# Patient Record
Sex: MALE | Race: WHITE | NOT HISPANIC OR LATINO | URBAN - METROPOLITAN AREA
[De-identification: names, ages, dates, MRNs, and addresses within clinical notes are randomized per-mention and may not be internally consistent; named-entity substitution may affect disease eponyms.]

---

## 2020-12-26 ENCOUNTER — OUTPATIENT (OUTPATIENT)
Dept: OUTPATIENT SERVICES | Facility: HOSPITAL | Age: 40
LOS: 1 days | End: 2020-12-26
Payer: COMMERCIAL

## 2020-12-26 DIAGNOSIS — Z20.828 CONTACT WITH AND (SUSPECTED) EXPOSURE TO OTHER VIRAL COMMUNICABLE DISEASES: ICD-10-CM

## 2020-12-26 PROCEDURE — C9803: CPT

## 2020-12-26 PROCEDURE — U0003: CPT

## 2020-12-27 DIAGNOSIS — Z20.828 CONTACT WITH AND (SUSPECTED) EXPOSURE TO OTHER VIRAL COMMUNICABLE DISEASES: ICD-10-CM

## 2020-12-27 LAB — SARS-COV-2 RNA SPEC QL NAA+PROBE: SIGNIFICANT CHANGE UP

## 2021-12-03 ENCOUNTER — OUTPATIENT (OUTPATIENT)
Dept: OUTPATIENT SERVICES | Facility: HOSPITAL | Age: 41
LOS: 1 days | End: 2021-12-03
Payer: COMMERCIAL

## 2021-12-03 DIAGNOSIS — Z20.828 CONTACT WITH AND (SUSPECTED) EXPOSURE TO OTHER VIRAL COMMUNICABLE DISEASES: ICD-10-CM

## 2021-12-03 LAB — SARS-COV-2 RNA SPEC QL NAA+PROBE: DETECTED

## 2021-12-03 PROCEDURE — U0003: CPT

## 2021-12-03 PROCEDURE — C9803: CPT

## 2021-12-03 PROCEDURE — U0005: CPT

## 2021-12-04 DIAGNOSIS — Z20.828 CONTACT WITH AND (SUSPECTED) EXPOSURE TO OTHER VIRAL COMMUNICABLE DISEASES: ICD-10-CM

## 2024-05-31 ENCOUNTER — INPATIENT (INPATIENT)
Facility: HOSPITAL | Age: 44
LOS: 3 days | Discharge: HOME | DRG: 558 | End: 2024-06-04
Attending: STUDENT IN AN ORGANIZED HEALTH CARE EDUCATION/TRAINING PROGRAM | Admitting: STUDENT IN AN ORGANIZED HEALTH CARE EDUCATION/TRAINING PROGRAM
Payer: COMMERCIAL

## 2024-05-31 VITALS — HEIGHT: 67 IN

## 2024-05-31 DIAGNOSIS — M79.A9 NONTRAUMATIC COMPARTMENT SYNDROME OF OTHER SITES: ICD-10-CM

## 2024-05-31 LAB
ABO RH CONFIRMATION: SIGNIFICANT CHANGE UP
ADD ON TEST-SPECIMEN IN LAB: SIGNIFICANT CHANGE UP
ALBUMIN SERPL ELPH-MCNC: 4.1 G/DL — SIGNIFICANT CHANGE UP (ref 3.3–5)
ALP SERPL-CCNC: 59 U/L — SIGNIFICANT CHANGE UP (ref 40–120)
ALT FLD-CCNC: 28 U/L — SIGNIFICANT CHANGE UP (ref 12–78)
ANION GAP SERPL CALC-SCNC: 3 MMOL/L — LOW (ref 5–17)
APPEARANCE UR: CLEAR — SIGNIFICANT CHANGE UP
APTT BLD: 30.5 SEC — SIGNIFICANT CHANGE UP (ref 24.5–35.6)
AST SERPL-CCNC: 14 U/L — LOW (ref 15–37)
BASOPHILS # BLD AUTO: 0.09 K/UL — SIGNIFICANT CHANGE UP (ref 0–0.2)
BASOPHILS NFR BLD AUTO: 1 % — SIGNIFICANT CHANGE UP (ref 0–2)
BILIRUB SERPL-MCNC: 0.5 MG/DL — SIGNIFICANT CHANGE UP (ref 0.2–1.2)
BILIRUB UR-MCNC: NEGATIVE — SIGNIFICANT CHANGE UP
BLD GP AB SCN SERPL QL: SIGNIFICANT CHANGE UP
BUN SERPL-MCNC: 16 MG/DL — SIGNIFICANT CHANGE UP (ref 7–23)
CALCIUM SERPL-MCNC: 9.4 MG/DL — SIGNIFICANT CHANGE UP (ref 8.5–10.1)
CHLORIDE SERPL-SCNC: 105 MMOL/L — SIGNIFICANT CHANGE UP (ref 96–108)
CK SERPL-CCNC: 141 U/L — SIGNIFICANT CHANGE UP (ref 26–308)
CO2 SERPL-SCNC: 30 MMOL/L — SIGNIFICANT CHANGE UP (ref 22–31)
COLOR SPEC: YELLOW — SIGNIFICANT CHANGE UP
CREAT SERPL-MCNC: 1.33 MG/DL — HIGH (ref 0.5–1.3)
D DIMER BLD IA.RAPID-MCNC: 292 NG/ML DDU — HIGH
DIFF PNL FLD: NEGATIVE — SIGNIFICANT CHANGE UP
EGFR: 67 ML/MIN/1.73M2 — SIGNIFICANT CHANGE UP
EOSINOPHIL # BLD AUTO: 0.19 K/UL — SIGNIFICANT CHANGE UP (ref 0–0.5)
EOSINOPHIL NFR BLD AUTO: 2.1 % — SIGNIFICANT CHANGE UP (ref 0–6)
ERYTHROCYTE [SEDIMENTATION RATE] IN BLOOD: 8 MM/HR — SIGNIFICANT CHANGE UP (ref 0–15)
GLUCOSE SERPL-MCNC: 90 MG/DL — SIGNIFICANT CHANGE UP (ref 70–99)
GLUCOSE UR QL: NEGATIVE MG/DL — SIGNIFICANT CHANGE UP
HCT VFR BLD CALC: 41.7 % — SIGNIFICANT CHANGE UP (ref 39–50)
HGB BLD-MCNC: 14.2 G/DL — SIGNIFICANT CHANGE UP (ref 13–17)
IMM GRANULOCYTES NFR BLD AUTO: 0.2 % — SIGNIFICANT CHANGE UP (ref 0–0.9)
INR BLD: 0.95 RATIO — SIGNIFICANT CHANGE UP (ref 0.85–1.18)
KETONES UR-MCNC: ABNORMAL MG/DL
LEUKOCYTE ESTERASE UR-ACNC: NEGATIVE — SIGNIFICANT CHANGE UP
LYMPHOCYTES # BLD AUTO: 2.71 K/UL — SIGNIFICANT CHANGE UP (ref 1–3.3)
LYMPHOCYTES # BLD AUTO: 29.5 % — SIGNIFICANT CHANGE UP (ref 13–44)
MCHC RBC-ENTMCNC: 29.7 PG — SIGNIFICANT CHANGE UP (ref 27–34)
MCHC RBC-ENTMCNC: 34.1 GM/DL — SIGNIFICANT CHANGE UP (ref 32–36)
MCV RBC AUTO: 87.2 FL — SIGNIFICANT CHANGE UP (ref 80–100)
MONOCYTES # BLD AUTO: 0.79 K/UL — SIGNIFICANT CHANGE UP (ref 0–0.9)
MONOCYTES NFR BLD AUTO: 8.6 % — SIGNIFICANT CHANGE UP (ref 2–14)
NEUTROPHILS # BLD AUTO: 5.39 K/UL — SIGNIFICANT CHANGE UP (ref 1.8–7.4)
NEUTROPHILS NFR BLD AUTO: 58.6 % — SIGNIFICANT CHANGE UP (ref 43–77)
NITRITE UR-MCNC: NEGATIVE — SIGNIFICANT CHANGE UP
PH UR: 5.5 — SIGNIFICANT CHANGE UP (ref 5–8)
PLATELET # BLD AUTO: 338 K/UL — SIGNIFICANT CHANGE UP (ref 150–400)
POTASSIUM SERPL-MCNC: 4.4 MMOL/L — SIGNIFICANT CHANGE UP (ref 3.5–5.3)
POTASSIUM SERPL-SCNC: 4.4 MMOL/L — SIGNIFICANT CHANGE UP (ref 3.5–5.3)
PROT SERPL-MCNC: 7.5 GM/DL — SIGNIFICANT CHANGE UP (ref 6–8.3)
PROT UR-MCNC: NEGATIVE MG/DL — SIGNIFICANT CHANGE UP
PROTHROM AB SERPL-ACNC: 10.7 SEC — SIGNIFICANT CHANGE UP (ref 9.5–13)
RBC # BLD: 4.78 M/UL — SIGNIFICANT CHANGE UP (ref 4.2–5.8)
RBC # FLD: 12.8 % — SIGNIFICANT CHANGE UP (ref 10.3–14.5)
SODIUM SERPL-SCNC: 138 MMOL/L — SIGNIFICANT CHANGE UP (ref 135–145)
SP GR SPEC: 1.03 — SIGNIFICANT CHANGE UP (ref 1–1.03)
UROBILINOGEN FLD QL: 1 MG/DL — SIGNIFICANT CHANGE UP (ref 0.2–1)
WBC # BLD: 9.19 K/UL — SIGNIFICANT CHANGE UP (ref 3.8–10.5)
WBC # FLD AUTO: 9.19 K/UL — SIGNIFICANT CHANGE UP (ref 3.8–10.5)

## 2024-05-31 PROCEDURE — 27602 DECOMPRESSION OF LOWER LEG: CPT

## 2024-05-31 PROCEDURE — 85610 PROTHROMBIN TIME: CPT

## 2024-05-31 PROCEDURE — 84100 ASSAY OF PHOSPHORUS: CPT

## 2024-05-31 PROCEDURE — 85025 COMPLETE CBC W/AUTO DIFF WBC: CPT

## 2024-05-31 PROCEDURE — 85730 THROMBOPLASTIN TIME PARTIAL: CPT

## 2024-05-31 PROCEDURE — 85027 COMPLETE CBC AUTOMATED: CPT

## 2024-05-31 PROCEDURE — 93971 EXTREMITY STUDY: CPT | Mod: 26,LT

## 2024-05-31 PROCEDURE — 73590 X-RAY EXAM OF LOWER LEG: CPT | Mod: 26,LT

## 2024-05-31 PROCEDURE — 27602 DECOMPRESSION OF LOWER LEG: CPT | Mod: AS

## 2024-05-31 PROCEDURE — 36415 COLL VENOUS BLD VENIPUNCTURE: CPT

## 2024-05-31 PROCEDURE — 80053 COMPREHEN METABOLIC PANEL: CPT

## 2024-05-31 PROCEDURE — 99285 EMERGENCY DEPT VISIT HI MDM: CPT

## 2024-05-31 PROCEDURE — 93010 ELECTROCARDIOGRAM REPORT: CPT

## 2024-05-31 PROCEDURE — 85379 FIBRIN DEGRADATION QUANT: CPT

## 2024-05-31 PROCEDURE — 80048 BASIC METABOLIC PNL TOTAL CA: CPT

## 2024-05-31 PROCEDURE — C1889: CPT

## 2024-05-31 PROCEDURE — 73701 CT LOWER EXTREMITY W/DYE: CPT | Mod: 26,LT,MC

## 2024-05-31 PROCEDURE — 73562 X-RAY EXAM OF KNEE 3: CPT | Mod: 26,LT

## 2024-05-31 PROCEDURE — 97162 PT EVAL MOD COMPLEX 30 MIN: CPT | Mod: GP

## 2024-05-31 PROCEDURE — 83735 ASSAY OF MAGNESIUM: CPT

## 2024-05-31 RX ORDER — ONDANSETRON 8 MG/1
4 TABLET, FILM COATED ORAL ONCE
Refills: 0 | Status: DISCONTINUED | OUTPATIENT
Start: 2024-05-31 | End: 2024-05-31

## 2024-05-31 RX ORDER — CEFAZOLIN SODIUM 1 G
2000 VIAL (EA) INJECTION EVERY 8 HOURS
Refills: 0 | Status: DISCONTINUED | OUTPATIENT
Start: 2024-05-31 | End: 2024-05-31

## 2024-05-31 RX ORDER — ACETAMINOPHEN 500 MG
1000 TABLET ORAL ONCE
Refills: 0 | Status: DISCONTINUED | OUTPATIENT
Start: 2024-05-31 | End: 2024-06-04

## 2024-05-31 RX ORDER — MORPHINE SULFATE 50 MG/1
2 CAPSULE, EXTENDED RELEASE ORAL EVERY 4 HOURS
Refills: 0 | Status: DISCONTINUED | OUTPATIENT
Start: 2024-05-31 | End: 2024-05-31

## 2024-05-31 RX ORDER — OXYCODONE HYDROCHLORIDE 5 MG/1
5 TABLET ORAL EVERY 6 HOURS
Refills: 0 | Status: DISCONTINUED | OUTPATIENT
Start: 2024-05-31 | End: 2024-05-31

## 2024-05-31 RX ORDER — HYDROMORPHONE HYDROCHLORIDE 2 MG/ML
0.5 INJECTION INTRAMUSCULAR; INTRAVENOUS; SUBCUTANEOUS
Refills: 0 | Status: DISCONTINUED | OUTPATIENT
Start: 2024-05-31 | End: 2024-05-31

## 2024-05-31 RX ORDER — ONDANSETRON 8 MG/1
4 TABLET, FILM COATED ORAL EVERY 6 HOURS
Refills: 0 | Status: DISCONTINUED | OUTPATIENT
Start: 2024-05-31 | End: 2024-06-04

## 2024-05-31 RX ORDER — OXYCODONE AND ACETAMINOPHEN 5; 325 MG/1; MG/1
1 TABLET ORAL EVERY 4 HOURS
Refills: 0 | Status: DISCONTINUED | OUTPATIENT
Start: 2024-05-31 | End: 2024-06-04

## 2024-05-31 RX ORDER — MORPHINE SULFATE 50 MG/1
2 CAPSULE, EXTENDED RELEASE ORAL EVERY 4 HOURS
Refills: 0 | Status: DISCONTINUED | OUTPATIENT
Start: 2024-05-31 | End: 2024-06-04

## 2024-05-31 RX ORDER — SODIUM CHLORIDE 9 MG/ML
3 INJECTION INTRAMUSCULAR; INTRAVENOUS; SUBCUTANEOUS ONCE
Refills: 0 | Status: DISCONTINUED | OUTPATIENT
Start: 2024-05-31 | End: 2024-06-04

## 2024-05-31 RX ORDER — HEPARIN SODIUM 5000 [USP'U]/ML
5000 INJECTION INTRAVENOUS; SUBCUTANEOUS EVERY 8 HOURS
Refills: 0 | Status: DISCONTINUED | OUTPATIENT
Start: 2024-06-01 | End: 2024-06-04

## 2024-05-31 RX ORDER — CEFAZOLIN SODIUM 1 G
2000 VIAL (EA) INJECTION ONCE
Refills: 0 | Status: COMPLETED | OUTPATIENT
Start: 2024-05-31 | End: 2024-06-01

## 2024-05-31 RX ORDER — SODIUM CHLORIDE 9 MG/ML
1000 INJECTION, SOLUTION INTRAVENOUS
Refills: 0 | Status: DISCONTINUED | OUTPATIENT
Start: 2024-05-31 | End: 2024-05-31

## 2024-05-31 RX ORDER — OXYCODONE HYDROCHLORIDE 5 MG/1
10 TABLET ORAL ONCE
Refills: 0 | Status: DISCONTINUED | OUTPATIENT
Start: 2024-05-31 | End: 2024-05-31

## 2024-05-31 RX ADMIN — HYDROMORPHONE HYDROCHLORIDE 0.5 MILLIGRAM(S): 2 INJECTION INTRAMUSCULAR; INTRAVENOUS; SUBCUTANEOUS at 20:21

## 2024-05-31 RX ADMIN — HYDROMORPHONE HYDROCHLORIDE 0.5 MILLIGRAM(S): 2 INJECTION INTRAMUSCULAR; INTRAVENOUS; SUBCUTANEOUS at 20:06

## 2024-05-31 RX ADMIN — HYDROMORPHONE HYDROCHLORIDE 0.5 MILLIGRAM(S): 2 INJECTION INTRAMUSCULAR; INTRAVENOUS; SUBCUTANEOUS at 19:51

## 2024-05-31 RX ADMIN — HYDROMORPHONE HYDROCHLORIDE 0.5 MILLIGRAM(S): 2 INJECTION INTRAMUSCULAR; INTRAVENOUS; SUBCUTANEOUS at 19:36

## 2024-05-31 RX ADMIN — SODIUM CHLORIDE 75 MILLILITER(S): 9 INJECTION, SOLUTION INTRAVENOUS at 19:42

## 2024-05-31 NOTE — ED STATDOCS - PHYSICAL EXAMINATION
Gen:  Well appearing in NAD  Head:  NC/AT  Resp: No distress   Ext: no deformities. LLE swelling behind knee to calf down to ankle and foot.   Skin: warm and dry as visualized

## 2024-05-31 NOTE — H&P ADULT - ATTENDING COMMENTS
44M with two days of calf pain after strenuous activity  Imaging shows intramusular hematoma  Physical exam with some numbness/tingling and concern for diminished ablility to dorsiflex foot  Taken emergently to OR for four compartment fasciotomy and evacuation of hematoma as high concern for compartment syndrome  Risks and benefits, including possible nerve damange from hematoma and future foot drop discussed with patient, who agreed to continue

## 2024-05-31 NOTE — H&P ADULT - HISTORY OF PRESENT ILLNESS
45 yo M with no significant PMHx p/w L calf pain. He report of intermittent pain for the past 2 weeks. This was associated with swelling which usually resolved spontaneously. He however experienced severe calf pain with increased swelling compare to prior episode. He denies trauma, not on any AC, no prior hx of this symptoms

## 2024-05-31 NOTE — ED STATDOCS - OBJECTIVE STATEMENT
45 y/o male with a PMHx of meniscus surgery 15 years ago presents to the ED c/o intermittent left calf swelling over the last few days. Pt reports left calf swelling has been constant since 2pm yesterday. Pt was seen in ortho office yesterday and was sent for LLE US. Pt had US which was negative for DVT. Pt reports 8/10 pain yesterday. No falls or trauma. No other complaints at this time.

## 2024-05-31 NOTE — H&P ADULT - NSHPPHYSICALEXAM_GEN_ALL_CORE
General: NAD, resting comfortably  HEENT: NC/AT, EOMI, normal hearing, no oral lesions, no LAD, neck supple  Pulmonary: normal resp effort, CTA-B  Cardiovascular: NSR, no murmurs  Abdominal: soft, ND/NT, no organomegaly  Extremities: Swollen L calf and ankle with tenderness on dorsiflexion of the foot limiting FROM, L calf measures 44cm circumference at a reference point 10cm from the tuberosity. Sensation is slight decrease compared to the RLE, no skin changes    Neuro: A/O x 3, CNs II-XII grossly intact, normal sensation, no focal deficits  Pulses: palpable distal pulses

## 2024-05-31 NOTE — ED STATDOCS - PRINCIPAL DIAGNOSIS
Brief Postoperative Note      Patient: Marty Pat  YOB: 1975  MRN: [id number]    Date of Procedure: 1/31/2024    Pre-Op Diagnosis Codes:     * Screen for colon cancer [Z12.11]    Post-Op Diagnosis: sigmoid diverticulosis       Procedure(s):  COLORECTAL CANCER SCREENING, NOT HIGH RISK    Surgeon(s):  Yony Perez MD    Assistant:  * No surgical staff found *    Anesthesia: Monitor Anesthesia Care    Estimated Blood Loss (mL): Minimal    Complications: None    Specimens:   * No specimens in log *    Implants:  * No implants in log *      Drains: * No LDAs found *    Findings: as above      Electronically signed by Yony Perez MD on 1/31/2024 at 1:51 PM   Compartment syndrome, nontraumatic

## 2024-05-31 NOTE — CONSULT NOTE ADULT - ASSESSMENT
45 yo M p/w L calf swelling.   US report 9.5 x 6.8 x 3.7 cm heterogeneous in echotexture collection within the left calf musculature, possible hematoma    Clinical assessment concern for early onset compartment syndrome     Recommendation   CT IV of LLE   Full labs including CPK  Vascular surgery consult for evaluation

## 2024-05-31 NOTE — ED ADULT TRIAGE NOTE - CHIEF COMPLAINT QUOTE
Pt presents to the ED c/o intermittent left calf swelling x2 weeks, worsening yesterday at 2pm. Pt reports pain 8-10/10. No redness noted. Denies SOB, blood thinner use, or fevers. Pt saw orthopedist and had an ultrasound done at [facility] yesterday which was negative for a DVT. Pt has been taking Motrin with minimal relief. PMH of left meniscus surgery 15 years ago.

## 2024-05-31 NOTE — ED STATDOCS - ATTENDING APP SHARED VISIT CONTRIBUTION OF CARE
Dr. Suarez: I performed a face to face bedside interview with patient regarding history of present illness, review of symptoms and past medical history. I completed an independent physical exam.  I have discussed patient's plan of care with PA.   I agree with note as stated above, having amended the EMR as needed to reflect my findings.   This includes HISTORY OF PRESENT ILLNESS, HIV, PAST MEDICAL/SURGICAL/FAMILY/SOCIAL HISTORY, ALLERGIES AND HOME MEDICATIONS, REVIEW OF SYSTEMS, PHYSICAL EXAM, and any PROGRESS NOTES during the time I functioned as the attending physician for this patient.  RAVEN Suarez DO

## 2024-05-31 NOTE — H&P ADULT - ASSESSMENT
45 yo M p/w L calf swelling.   US report 9.5 x 6.8 x 3.7 cm heterogeneous in echotexture collection within the left calf musculature, possible hematoma    Clinical assessment concern for early onset compartment syndrome       Plan   Admit for Vascular surgery   Keep NPO for possible OR  Keep LLE elevated   Pain control   SCD for DVT ppx   F/u CT scan of  LLE   Preop labs with      Discussed with Attending

## 2024-05-31 NOTE — ED ADULT NURSE NOTE - OBJECTIVE STATEMENT
Pt presents to the ED c/o intermittent left calf swelling x2 weeks, worsening yesterday at 2pm. Pt reports pain 8-10/10. No redness noted. Denies SOB, blood thinner use, or fevers. Pt saw orthopedist and had an ultrasound done at [facility] yesterday which was negative for a DVT. Pt has been taking Motrin with minimal relief. PMH of left meniscus surgery 15 years ago.

## 2024-05-31 NOTE — ED ADULT NURSE NOTE - CHIEF COMPLAINT QUOTE
Pt presents to the ED c/o intermittent left calf swelling x2 weeks, worsening yesterday at 2pm. Pt reports pain 8-10/10. No redness noted. Denies SOB, blood thinner use, or fevers. Pt saw orthopedist and had an ultrasound done at [facility] yesterday which was negative for a DVT. Pt has been taking Motrin with minimal relief. PMH of left meniscus surgery 15 years ago.

## 2024-05-31 NOTE — ED ADULT TRIAGE NOTE - AS PAIN REST
-- DO NOT REPLY / DO NOT REPLY ALL --  -- Message is from Engagement Center Operations (ECO) --    General Patient Message: Patient called stating PCP want patient to see an OB/GYN stated she is having difficulty getting schedule in please call to discuss at your convenience      Caller Information       Type Contact Phone/Fax    01/27/2023 12:38 PM CST Phone (Incoming) Alysa Son (Self) [phone] (M)        Alternative phone number: None    Can a detailed message be left? Yes    Message Turnaround: [place]:    Refer to site's KB page for routing instructions    Please give this turnaround time to the caller:   \"You can expect to receive a response 1-3 business days after your provider's clinical team reviews the message\"               8 (severe pain)

## 2024-05-31 NOTE — H&P ADULT - NSHPLABSRESULTS_GEN_ALL_CORE
14.2   9.19  )-----------( 338      ( 31 May 2024 17:19 )             41.7       < from: US Duplex Venous Lower Ext Ltd, Left (05.31.24 @ 14:07) >    IMPRESSION:  No evidence of left lower extremity deep venous thrombosis. There is a   9.5 x 6.8 x 3.7 cm heterogeneous in echotexture collection within the   left calf musculature, possible hematoma. Correlate clinically.    < end of copied text >

## 2024-05-31 NOTE — BRIEF OPERATIVE NOTE - NSICDXBRIEFPREOP_GEN_ALL_CORE_FT
PRE-OP DIAGNOSIS:  Hematoma 31-May-2024 19:18:14  Judith Andersen   PRE-OP DIAGNOSIS:  Compartment syndrome, nontraumatic 31-May-2024 20:21:23  Judith Andersen E

## 2024-05-31 NOTE — ED STATDOCS - PROGRESS NOTE DETAILS
45 y/o male with a PMHx of meniscus surgery 15 years ago presents to the ED c/o intermittent left calf swelling over the last few days. Pt reports left calf swelling has been constant since 2pm yesterday. Pt was seen in ortho office yesterday and was sent for LLE US. Pt had US which was negative for DVT. Pt reports 8/10 pain yesterday. No falls or trauma. No other complaints at this time.  Carolina Yao PA-C Surgical resident evaluated patient.  Labs, CT ordered and Dr. Matias, vascular surgeon, examined patient as well.  Carolina Yao PA-C I spoke with surgical resident. patinent to be admitted to Dr. Kelly, vascular.  Carolina Yao PA-C

## 2024-06-01 LAB
ALBUMIN SERPL ELPH-MCNC: 3.3 G/DL — SIGNIFICANT CHANGE UP (ref 3.3–5)
ALP SERPL-CCNC: 49 U/L — SIGNIFICANT CHANGE UP (ref 40–120)
ALT FLD-CCNC: 25 U/L — SIGNIFICANT CHANGE UP (ref 12–78)
ANION GAP SERPL CALC-SCNC: 4 MMOL/L — LOW (ref 5–17)
AST SERPL-CCNC: 21 U/L — SIGNIFICANT CHANGE UP (ref 15–37)
BILIRUB SERPL-MCNC: 0.3 MG/DL — SIGNIFICANT CHANGE UP (ref 0.2–1.2)
BUN SERPL-MCNC: 15 MG/DL — SIGNIFICANT CHANGE UP (ref 7–23)
CALCIUM SERPL-MCNC: 8.9 MG/DL — SIGNIFICANT CHANGE UP (ref 8.5–10.1)
CHLORIDE SERPL-SCNC: 107 MMOL/L — SIGNIFICANT CHANGE UP (ref 96–108)
CO2 SERPL-SCNC: 26 MMOL/L — SIGNIFICANT CHANGE UP (ref 22–31)
CREAT SERPL-MCNC: 1.14 MG/DL — SIGNIFICANT CHANGE UP (ref 0.5–1.3)
CRP SERPL-MCNC: 29 MG/L — HIGH
EGFR: 80 ML/MIN/1.73M2 — SIGNIFICANT CHANGE UP
GLUCOSE SERPL-MCNC: 125 MG/DL — HIGH (ref 70–99)
HCT VFR BLD CALC: 35.8 % — LOW (ref 39–50)
HGB BLD-MCNC: 12.3 G/DL — LOW (ref 13–17)
MAGNESIUM SERPL-MCNC: 1.9 MG/DL — SIGNIFICANT CHANGE UP (ref 1.6–2.6)
MCHC RBC-ENTMCNC: 29.5 PG — SIGNIFICANT CHANGE UP (ref 27–34)
MCHC RBC-ENTMCNC: 34.4 GM/DL — SIGNIFICANT CHANGE UP (ref 32–36)
MCV RBC AUTO: 85.9 FL — SIGNIFICANT CHANGE UP (ref 80–100)
MYOGLOBIN SERPL-MCNC: 35 NG/ML — SIGNIFICANT CHANGE UP (ref 28–72)
PHOSPHATE SERPL-MCNC: 3.7 MG/DL — SIGNIFICANT CHANGE UP (ref 2.5–4.5)
PLATELET # BLD AUTO: 289 K/UL — SIGNIFICANT CHANGE UP (ref 150–400)
POTASSIUM SERPL-MCNC: 4.4 MMOL/L — SIGNIFICANT CHANGE UP (ref 3.5–5.3)
POTASSIUM SERPL-SCNC: 4.4 MMOL/L — SIGNIFICANT CHANGE UP (ref 3.5–5.3)
PROT SERPL-MCNC: 6.4 GM/DL — SIGNIFICANT CHANGE UP (ref 6–8.3)
RBC # BLD: 4.17 M/UL — LOW (ref 4.2–5.8)
RBC # FLD: 12.7 % — SIGNIFICANT CHANGE UP (ref 10.3–14.5)
SODIUM SERPL-SCNC: 137 MMOL/L — SIGNIFICANT CHANGE UP (ref 135–145)
WBC # BLD: 12.69 K/UL — HIGH (ref 3.8–10.5)
WBC # FLD AUTO: 12.69 K/UL — HIGH (ref 3.8–10.5)

## 2024-06-01 RX ORDER — ACETAMINOPHEN 500 MG
650 TABLET ORAL EVERY 6 HOURS
Refills: 0 | Status: DISCONTINUED | OUTPATIENT
Start: 2024-06-01 | End: 2024-06-02

## 2024-06-01 RX ADMIN — Medication 650 MILLIGRAM(S): at 14:13

## 2024-06-01 RX ADMIN — HEPARIN SODIUM 5000 UNIT(S): 5000 INJECTION INTRAVENOUS; SUBCUTANEOUS at 06:28

## 2024-06-01 RX ADMIN — Medication 2000 MILLIGRAM(S): at 06:28

## 2024-06-01 RX ADMIN — Medication 650 MILLIGRAM(S): at 14:43

## 2024-06-01 RX ADMIN — MORPHINE SULFATE 2 MILLIGRAM(S): 50 CAPSULE, EXTENDED RELEASE ORAL at 20:39

## 2024-06-01 RX ADMIN — HEPARIN SODIUM 5000 UNIT(S): 5000 INJECTION INTRAVENOUS; SUBCUTANEOUS at 22:04

## 2024-06-01 RX ADMIN — HEPARIN SODIUM 5000 UNIT(S): 5000 INJECTION INTRAVENOUS; SUBCUTANEOUS at 14:13

## 2024-06-01 RX ADMIN — MORPHINE SULFATE 2 MILLIGRAM(S): 50 CAPSULE, EXTENDED RELEASE ORAL at 20:24

## 2024-06-01 NOTE — PROGRESS NOTE ADULT - ATTENDING COMMENTS
seen and examined, feeling well pain controlled  dressing change today  dosiflexion of foot inproving

## 2024-06-02 LAB
ALBUMIN SERPL ELPH-MCNC: 3.4 G/DL — SIGNIFICANT CHANGE UP (ref 3.3–5)
ALP SERPL-CCNC: 50 U/L — SIGNIFICANT CHANGE UP (ref 40–120)
ALT FLD-CCNC: 21 U/L — SIGNIFICANT CHANGE UP (ref 12–78)
ANION GAP SERPL CALC-SCNC: 5 MMOL/L — SIGNIFICANT CHANGE UP (ref 5–17)
AST SERPL-CCNC: 4 U/L — LOW (ref 15–37)
BILIRUB SERPL-MCNC: 0.4 MG/DL — SIGNIFICANT CHANGE UP (ref 0.2–1.2)
BUN SERPL-MCNC: 19 MG/DL — SIGNIFICANT CHANGE UP (ref 7–23)
CALCIUM SERPL-MCNC: 8.8 MG/DL — SIGNIFICANT CHANGE UP (ref 8.5–10.1)
CHLORIDE SERPL-SCNC: 106 MMOL/L — SIGNIFICANT CHANGE UP (ref 96–108)
CO2 SERPL-SCNC: 29 MMOL/L — SIGNIFICANT CHANGE UP (ref 22–31)
CREAT SERPL-MCNC: 1.25 MG/DL — SIGNIFICANT CHANGE UP (ref 0.5–1.3)
EGFR: 72 ML/MIN/1.73M2 — SIGNIFICANT CHANGE UP
GLUCOSE SERPL-MCNC: 92 MG/DL — SIGNIFICANT CHANGE UP (ref 70–99)
HCT VFR BLD CALC: 36.2 % — LOW (ref 39–50)
HGB BLD-MCNC: 11.9 G/DL — LOW (ref 13–17)
MAGNESIUM SERPL-MCNC: 1.9 MG/DL — SIGNIFICANT CHANGE UP (ref 1.6–2.6)
MCHC RBC-ENTMCNC: 28.7 PG — SIGNIFICANT CHANGE UP (ref 27–34)
MCHC RBC-ENTMCNC: 32.9 GM/DL — SIGNIFICANT CHANGE UP (ref 32–36)
MCV RBC AUTO: 87.2 FL — SIGNIFICANT CHANGE UP (ref 80–100)
PHOSPHATE SERPL-MCNC: 3.1 MG/DL — SIGNIFICANT CHANGE UP (ref 2.5–4.5)
PLATELET # BLD AUTO: 263 K/UL — SIGNIFICANT CHANGE UP (ref 150–400)
POTASSIUM SERPL-MCNC: 4 MMOL/L — SIGNIFICANT CHANGE UP (ref 3.5–5.3)
POTASSIUM SERPL-SCNC: 4 MMOL/L — SIGNIFICANT CHANGE UP (ref 3.5–5.3)
PROT SERPL-MCNC: 6.4 GM/DL — SIGNIFICANT CHANGE UP (ref 6–8.3)
RBC # BLD: 4.15 M/UL — LOW (ref 4.2–5.8)
RBC # FLD: 12.8 % — SIGNIFICANT CHANGE UP (ref 10.3–14.5)
SODIUM SERPL-SCNC: 140 MMOL/L — SIGNIFICANT CHANGE UP (ref 135–145)
WBC # BLD: 8.8 K/UL — SIGNIFICANT CHANGE UP (ref 3.8–10.5)
WBC # FLD AUTO: 8.8 K/UL — SIGNIFICANT CHANGE UP (ref 3.8–10.5)

## 2024-06-02 RX ORDER — POLYETHYLENE GLYCOL 3350 17 G/17G
17 POWDER, FOR SOLUTION ORAL DAILY
Refills: 0 | Status: DISCONTINUED | OUTPATIENT
Start: 2024-06-02 | End: 2024-06-04

## 2024-06-02 RX ORDER — ACETAMINOPHEN 500 MG
650 TABLET ORAL EVERY 6 HOURS
Refills: 0 | Status: DISCONTINUED | OUTPATIENT
Start: 2024-06-02 | End: 2024-06-04

## 2024-06-02 RX ADMIN — MORPHINE SULFATE 2 MILLIGRAM(S): 50 CAPSULE, EXTENDED RELEASE ORAL at 18:11

## 2024-06-02 RX ADMIN — HEPARIN SODIUM 5000 UNIT(S): 5000 INJECTION INTRAVENOUS; SUBCUTANEOUS at 13:30

## 2024-06-02 RX ADMIN — Medication 650 MILLIGRAM(S): at 17:54

## 2024-06-02 RX ADMIN — HEPARIN SODIUM 5000 UNIT(S): 5000 INJECTION INTRAVENOUS; SUBCUTANEOUS at 22:03

## 2024-06-02 RX ADMIN — HEPARIN SODIUM 5000 UNIT(S): 5000 INJECTION INTRAVENOUS; SUBCUTANEOUS at 06:01

## 2024-06-02 RX ADMIN — POLYETHYLENE GLYCOL 3350 17 GRAM(S): 17 POWDER, FOR SOLUTION ORAL at 17:59

## 2024-06-02 NOTE — PROGRESS NOTE ADULT - ATTENDING COMMENTS
seen and examined, feeling well pain controlled  dressing change today  dosiflexion of foot inproving recovering well  plastics consult tomorrow for fasciotomy closure

## 2024-06-03 RX ADMIN — MORPHINE SULFATE 2 MILLIGRAM(S): 50 CAPSULE, EXTENDED RELEASE ORAL at 21:27

## 2024-06-03 RX ADMIN — HEPARIN SODIUM 5000 UNIT(S): 5000 INJECTION INTRAVENOUS; SUBCUTANEOUS at 13:21

## 2024-06-03 RX ADMIN — Medication 650 MILLIGRAM(S): at 05:59

## 2024-06-03 RX ADMIN — HEPARIN SODIUM 5000 UNIT(S): 5000 INJECTION INTRAVENOUS; SUBCUTANEOUS at 05:59

## 2024-06-03 RX ADMIN — MORPHINE SULFATE 2 MILLIGRAM(S): 50 CAPSULE, EXTENDED RELEASE ORAL at 20:57

## 2024-06-03 RX ADMIN — HEPARIN SODIUM 5000 UNIT(S): 5000 INJECTION INTRAVENOUS; SUBCUTANEOUS at 21:33

## 2024-06-03 NOTE — PHYSICAL THERAPY INITIAL EVALUATION ADULT - CRITERIA FOR SKILLED THERAPEUTIC INTERVENTIONS
No Need for continued acute care PT services at present, will d/c from acute care PT at present, please re-consult PT PRN, the pt should ambulate under nursing supervision PRN and should use axillary crutches PRN for comfort./anticipated discharge recommendation

## 2024-06-03 NOTE — PHYSICAL THERAPY INITIAL EVALUATION ADULT - GENERAL OBSERVATIONS, REHAB EVAL
The pt was pleasant and cooperative, received on 2N, supine and eager to participate in PT evaluation. Left distal LE ACE Wrapped

## 2024-06-03 NOTE — PATIENT PROFILE ADULT - FALL HARM RISK - HARM RISK INTERVENTIONS
Assistance with ambulation/Assistance OOB with selected safe patient handling equipment/Communicate Risk of Fall with Harm to all staff/Discuss with provider need for PT consult/Monitor gait and stability/Provide patient with walking aids - walker, cane, crutches/Reinforce activity limits and safety measures with patient and family/Sit up slowly, dangle for a short time, stand at bedside before walking/Tailored Fall Risk Interventions/Use of alarms - bed, chair and/or voice tab/Visual Cue: Yellow wristband and red socks/Bed in lowest position, wheels locked, appropriate side rails in place/Call bell, personal items and telephone in reach/Instruct patient to call for assistance before getting out of bed or chair/Non-slip footwear when patient is out of bed/[place] to call system/Physically safe environment - no spills, clutter or unnecessary equipment/Purposeful Proactive Rounding/Room/bathroom lighting operational, light cord in reach

## 2024-06-03 NOTE — PHYSICAL THERAPY INITIAL EVALUATION ADULT - PERTINENT HX OF CURRENT PROBLEM, REHAB EVAL
43 yo M with no significant PMHx p/w L calf pain. He report of intermittent pain for the past 2 weeks. This was associated with swelling which usually resolved spontaneously. He however experienced severe calf pain with increased swelling compare to prior episode.

## 2024-06-04 VITALS
RESPIRATION RATE: 18 BRPM | DIASTOLIC BLOOD PRESSURE: 74 MMHG | HEART RATE: 68 BPM | TEMPERATURE: 98 F | SYSTOLIC BLOOD PRESSURE: 120 MMHG | OXYGEN SATURATION: 100 %

## 2024-06-04 LAB
ALBUMIN SERPL ELPH-MCNC: 3.6 G/DL — SIGNIFICANT CHANGE UP (ref 3.3–5)
ALP SERPL-CCNC: 56 U/L — SIGNIFICANT CHANGE UP (ref 40–120)
ALT FLD-CCNC: 36 U/L — SIGNIFICANT CHANGE UP (ref 12–78)
ANION GAP SERPL CALC-SCNC: 4 MMOL/L — LOW (ref 5–17)
AST SERPL-CCNC: 24 U/L — SIGNIFICANT CHANGE UP (ref 15–37)
BASOPHILS # BLD AUTO: 0.07 K/UL — SIGNIFICANT CHANGE UP (ref 0–0.2)
BASOPHILS NFR BLD AUTO: 0.8 % — SIGNIFICANT CHANGE UP (ref 0–2)
BILIRUB SERPL-MCNC: 0.5 MG/DL — SIGNIFICANT CHANGE UP (ref 0.2–1.2)
BUN SERPL-MCNC: 16 MG/DL — SIGNIFICANT CHANGE UP (ref 7–23)
CALCIUM SERPL-MCNC: 9.6 MG/DL — SIGNIFICANT CHANGE UP (ref 8.5–10.1)
CHLORIDE SERPL-SCNC: 106 MMOL/L — SIGNIFICANT CHANGE UP (ref 96–108)
CO2 SERPL-SCNC: 25 MMOL/L — SIGNIFICANT CHANGE UP (ref 22–31)
CREAT SERPL-MCNC: 1.25 MG/DL — SIGNIFICANT CHANGE UP (ref 0.5–1.3)
EGFR: 72 ML/MIN/1.73M2 — SIGNIFICANT CHANGE UP
EOSINOPHIL # BLD AUTO: 0.1 K/UL — SIGNIFICANT CHANGE UP (ref 0–0.5)
EOSINOPHIL NFR BLD AUTO: 1.1 % — SIGNIFICANT CHANGE UP (ref 0–6)
GLUCOSE SERPL-MCNC: 103 MG/DL — HIGH (ref 70–99)
HCT VFR BLD CALC: 39.9 % — SIGNIFICANT CHANGE UP (ref 39–50)
HGB BLD-MCNC: 13.6 G/DL — SIGNIFICANT CHANGE UP (ref 13–17)
IMM GRANULOCYTES NFR BLD AUTO: 0.4 % — SIGNIFICANT CHANGE UP (ref 0–0.9)
LYMPHOCYTES # BLD AUTO: 1.49 K/UL — SIGNIFICANT CHANGE UP (ref 1–3.3)
LYMPHOCYTES # BLD AUTO: 16.1 % — SIGNIFICANT CHANGE UP (ref 13–44)
MAGNESIUM SERPL-MCNC: 2.1 MG/DL — SIGNIFICANT CHANGE UP (ref 1.6–2.6)
MCHC RBC-ENTMCNC: 29.1 PG — SIGNIFICANT CHANGE UP (ref 27–34)
MCHC RBC-ENTMCNC: 34.1 GM/DL — SIGNIFICANT CHANGE UP (ref 32–36)
MCV RBC AUTO: 85.4 FL — SIGNIFICANT CHANGE UP (ref 80–100)
MONOCYTES # BLD AUTO: 0.81 K/UL — SIGNIFICANT CHANGE UP (ref 0–0.9)
MONOCYTES NFR BLD AUTO: 8.7 % — SIGNIFICANT CHANGE UP (ref 2–14)
NEUTROPHILS # BLD AUTO: 6.75 K/UL — SIGNIFICANT CHANGE UP (ref 1.8–7.4)
NEUTROPHILS NFR BLD AUTO: 72.9 % — SIGNIFICANT CHANGE UP (ref 43–77)
PHOSPHATE SERPL-MCNC: 3.6 MG/DL — SIGNIFICANT CHANGE UP (ref 2.5–4.5)
PLATELET # BLD AUTO: 333 K/UL — SIGNIFICANT CHANGE UP (ref 150–400)
POTASSIUM SERPL-MCNC: 4.6 MMOL/L — SIGNIFICANT CHANGE UP (ref 3.5–5.3)
POTASSIUM SERPL-SCNC: 4.6 MMOL/L — SIGNIFICANT CHANGE UP (ref 3.5–5.3)
PROT SERPL-MCNC: 7.1 GM/DL — SIGNIFICANT CHANGE UP (ref 6–8.3)
RBC # BLD: 4.67 M/UL — SIGNIFICANT CHANGE UP (ref 4.2–5.8)
RBC # FLD: 12.6 % — SIGNIFICANT CHANGE UP (ref 10.3–14.5)
SODIUM SERPL-SCNC: 135 MMOL/L — SIGNIFICANT CHANGE UP (ref 135–145)
WBC # BLD: 9.26 K/UL — SIGNIFICANT CHANGE UP (ref 3.8–10.5)
WBC # FLD AUTO: 9.26 K/UL — SIGNIFICANT CHANGE UP (ref 3.8–10.5)

## 2024-06-04 RX ORDER — OXYCODONE AND ACETAMINOPHEN 5; 325 MG/1; MG/1
2 TABLET ORAL
Qty: 0 | Refills: 0 | DISCHARGE
Start: 2024-06-04

## 2024-06-04 RX ORDER — OXYCODONE AND ACETAMINOPHEN 5; 325 MG/1; MG/1
1 TABLET ORAL
Qty: 0 | Refills: 0 | DISCHARGE
Start: 2024-06-04

## 2024-06-04 RX ADMIN — HEPARIN SODIUM 5000 UNIT(S): 5000 INJECTION INTRAVENOUS; SUBCUTANEOUS at 12:47

## 2024-06-04 RX ADMIN — Medication 650 MILLIGRAM(S): at 11:32

## 2024-06-04 RX ADMIN — HEPARIN SODIUM 5000 UNIT(S): 5000 INJECTION INTRAVENOUS; SUBCUTANEOUS at 06:51

## 2024-06-04 RX ADMIN — POLYETHYLENE GLYCOL 3350 17 GRAM(S): 17 POWDER, FOR SOLUTION ORAL at 09:38

## 2024-06-04 RX ADMIN — MORPHINE SULFATE 2 MILLIGRAM(S): 50 CAPSULE, EXTENDED RELEASE ORAL at 11:35

## 2024-06-04 RX ADMIN — Medication 650 MILLIGRAM(S): at 06:51

## 2024-06-04 RX ADMIN — MORPHINE SULFATE 2 MILLIGRAM(S): 50 CAPSULE, EXTENDED RELEASE ORAL at 11:50

## 2024-06-04 NOTE — PROCEDURE NOTE - ADDITIONAL PROCEDURE DETAILS
Patient is a 44 year old male with LLE compartment syndrome POD#4 from LLE lateral and medical fasciotomies with vascular surgery. Wounds are clean with exposed muscle in wound bases. Medial measures 15 x 10 x 1 cm and lateral measures 6 x 3 x 1 cm.      Procedure: initial application of negative pressure wound dressing to LLE > 50 sq cm.    Details: patient was given morphine for pain control prior to starting. Dressing was removed and wounds were cleaned with sterile water. Wound vac sponges were cut to the size of the wound and applied to both wounds with a xeroform barrier directly on the wounds. A bridging sponge was then placed to connect them and the beth pad was attached. The wound vac was turned on and set to wound vac therapy continuous at -125 mmHg. There was no air leak. ACE wraps were then applied to the LLE. The procedure was well tolerated and without complication.

## 2024-06-04 NOTE — DISCHARGE NOTE PROVIDER - CARE PROVIDER_API CALL
Lexy Kelly  Vascular Surgery  [address], [place] [address]  Phone: [phone]  Fax: [phone]  Follow Up Time: 2 weeks    Heather Kaiser  Plastic Surgery  [address], [address]  [place] [address]  Phone: [phone]  Fax: [phone]  Follow Up Time: 1-3 days

## 2024-06-04 NOTE — DISCHARGE NOTE PROVIDER - CARE PROVIDERS DIRECT ADDRESSES
,DirectAddress_Unknown,[email or web address]

## 2024-06-04 NOTE — PROGRESS NOTE ADULT - SUBJECTIVE AND OBJECTIVE BOX
Patient seen at bedside on AM rounds. Patient states he is feeling well after surgery. He states his pain is well controlled, he voided, and he tolerated diet. He has not yet ambulated. Patient denies nausea, vomiting, numbness, tingling, CP, or SOB.     Physical Exam:  Pt is AAOx3  General: No acute distress.   Chest: Non-labored respirations, symmetric chest rise  Heart: RRR  Abdomen: Soft, nondistended  Back: Normal curvature, no tenderness.   Neuro: Physiological, no localizing findings.   Skin: Normal, no rashes, no lesions noted.   Extremities: LLE with surgical dressing from midfoot to proximal calf. Toes with FROM, warm, soft, NVI. Through dressing, calf is soft.     Vital Signs Last 24 Hrs  T(C): 36.6 (01 Jun 2024 04:00), Max: 36.9 (31 May 2024 15:16)  T(F): 97.9 (01 Jun 2024 04:00), Max: 98.5 (31 May 2024 15:16)  HR: 68 (01 Jun 2024 04:00) (68 - 82)  BP: 96/63 (01 Jun 2024 04:00) (96/63 - 153/110)  BP(mean): 83 (31 May 2024 15:16) (83 - 91)  RR: 16 (01 Jun 2024 04:00) (12 - 18)  SpO2: 95% (01 Jun 2024 04:00) (94% - 100%)    Parameters below as of 01 Jun 2024 04:00  Patient On (Oxygen Delivery Method): room air                            14.2   9.19  )-----------( 338      ( 31 May 2024 17:19 )             41.7     05-31    138  |  105  |  16  ----------------------------<  90  4.4   |  30  |  1.33<H>    Ca    9.4      31 May 2024 17:19    TPro  7.5  /  Alb  4.1  /  TBili  0.5  /  DBili  x   /  AST  14<L>  /  ALT  28  /  AlkPhos  59  05-31    I&O's Summary    31 May 2024 07:01  -  01 Jun 2024 07:00  --------------------------------------------------------  IN: 800 mL / OUT: 0 mL / NET: 800 mL      
Patient seen at bedside on AM rounds. Patient states he is feeling well, He states his pain is well controlled, he voided, and he tolerated diet. Patient denies nausea, vomiting, numbness, tingling, CP, or SOB.       Physical Exam:  Pt is AAOx3  General: No acute distress.   Chest: Non-labored respirations, symmetric chest rise  Heart: RRR  Abdomen: Soft, nondistended  Back: Normal curvature, no tenderness.   Neuro: Physiological, no localizing findings.   Skin: Normal, no rashes, no lesions noted.   Extremities: LLE with surgical dressing from midfoot to proximal calf. Toes with FROM, warm, soft, NVI. Through dressing, calf is soft.     Vitals:  T(C): 36.6 ( @ 08:34), Max: 37.2 ( @ 16:04)  HR: 67 ( @ 08:34) (67 - 76)  BP: 115/74 ( @ 08:34) (107/74 - 117/70)  RR: 18 ( @ 08:34) (18 - 18)  SpO2: 99% ( @ 08:34) (96% - 100%)     @ 06:37                    12.4  CBC: 8.09>)-------(<298                     36.6                 138 | 106 | 17    CMP:  ----------------------< 106               4.2 | 27 | 1.19                      Ca:9.1  Phos:3.7  M.0               -|      |-        LFTs:  ------|-|-----             -|      |-   @ 07:08                    11.9  CBC: 8.80>)-------(<263                     36.2                 140 | 106 | 19    CMP:  ----------------------< 92               4.0 | 29 | 1.25                      Ca:8.8  Phos:3.1  M.9               0.4|      |4        LFTs:  ------|50|-----             -|      |-      Current Inpatient Medications:  acetaminophen     Tablet .. 650 milliGRAM(s) Oral every 6 hours  acetaminophen   IVPB .. 1000 milliGRAM(s) IV Intermittent once PRN  heparin   Injectable 5000 Unit(s) SubCutaneous every 8 hours  morphine  - Injectable 2 milliGRAM(s) IV Push every 4 hours PRN  ondansetron Injectable 4 milliGRAM(s) IV Push every 6 hours PRN  oxycodone    5 mG/acetaminophen 325 mG 1 Tablet(s) Oral every 4 hours PRN  oxycodone    5 mG/acetaminophen 325 mG 2 Tablet(s) Oral every 4 hours PRN  polyethylene glycol 3350 17 Gram(s) Oral daily  sodium chloride 0.9% lock flush 3 milliLiter(s) IV Push once      
Patient seen at bedside on AM rounds. Patient states he is feeling well, He states his pain is well controlled, he voided, and he tolerated diet. Patient denies nausea, vomiting, numbness, tingling, CP, or SOB.       Physical Exam:  Pt is AAOx3  General: No acute distress.   Chest: Non-labored respirations, symmetric chest rise  Heart: RRR  Abdomen: Soft, nondistended  Back: Normal curvature, no tenderness.   Neuro: Physiological, no localizing findings.   Skin: Normal, no rashes, no lesions noted.   Extremities: LLE with surgical dressing from midfoot to proximal calf. Toes with FROM, warm, soft, NVI. Through dressing, calf is soft.   medial side wound still presenting muscle edema, otherwise healthy muscle     Vitals:  T(C): 36.8 (- @ 00:00), Max: 36.8 (- @ 00:00)  HR: 77 (- @ 00:00) (65 - 77)  BP: 116/71 (- @ 00:00) (115/74 - 137/78)  RR: 18 (- @ 00:00) (18 - 18)  SpO2: 98% (- @ 00:00) (98% - 100%)      06-03 @ 06:37                    12.4  CBC: 8.09>)-------(<298                     36.6                 138 | 106 | 17    CMP:  ----------------------< 106               4.2 | 27 | 1.19                      Ca:9.1  Phos:3.7  M.0               -|      |-        LFTs:  ------|-|-----             -|      |-      Current Inpatient Medications:  acetaminophen     Tablet .. 650 milliGRAM(s) Oral every 6 hours  acetaminophen   IVPB .. 1000 milliGRAM(s) IV Intermittent once PRN  heparin   Injectable 5000 Unit(s) SubCutaneous every 8 hours  morphine  - Injectable 2 milliGRAM(s) IV Push every 4 hours PRN  ondansetron Injectable 4 milliGRAM(s) IV Push every 6 hours PRN  oxycodone    5 mG/acetaminophen 325 mG 1 Tablet(s) Oral every 4 hours PRN  oxycodone    5 mG/acetaminophen 325 mG 2 Tablet(s) Oral every 4 hours PRN  polyethylene glycol 3350 17 Gram(s) Oral daily  sodium chloride 0.9% lock flush 3 milliLiter(s) IV Push once      
Patient seen at bedside on AM rounds. Patient states he is feeling well, He states his pain is well controlled, he voided, and he tolerated diet. Patient denies nausea, vomiting, numbness, tingling, CP, or SOB.     dressing changed yesterday, healing well, tolerated       Physical Exam:  Pt is AAOx3  General: No acute distress.   Chest: Non-labored respirations, symmetric chest rise  Heart: RRR  Abdomen: Soft, nondistended  Back: Normal curvature, no tenderness.   Neuro: Physiological, no localizing findings.   Skin: Normal, no rashes, no lesions noted.   Extremities: LLE with surgical dressing from midfoot to proximal calf. Toes with FROM, warm, soft, NVI. Through dressing, calf is soft.     STATUS POST:      POST OPERATIVE DAY #:     Vital Signs Last 24 Hrs  T(C): 36.8 (02 Jun 2024 04:05), Max: 36.8 (01 Jun 2024 12:00)  T(F): 98.2 (02 Jun 2024 04:05), Max: 98.2 (01 Jun 2024 12:00)  HR: 73 (02 Jun 2024 04:05) (73 - 97)  BP: 103/80 (02 Jun 2024 04:05) (103/80 - 116/67)  BP(mean): 83 (01 Jun 2024 16:06) (83 - 83)  RR: 16 (02 Jun 2024 04:05) (16 - 16)  SpO2: 96% (02 Jun 2024 04:05) (96% - 100%)    Parameters below as of 02 Jun 2024 04:05  Patient On (Oxygen Delivery Method): room air                31 May 2024 07:01  -  01 Jun 2024 07:00  --------------------------------------------------------  IN: 800 mL / OUT: 0 mL / NET: 800 mL      I&O's Detail    31 May 2024 07:01  -  01 Jun 2024 07:00  --------------------------------------------------------  IN:    Oral Fluid: 50 mL    Other (mL): 750 mL  Total IN: 800 mL    OUT:    Voided (mL): 0 mL  Total OUT: 0 mL    Total NET: 800 mL          MEDICATIONS  (STANDING):  heparin   Injectable 5000 Unit(s) SubCutaneous every 8 hours  sodium chloride 0.9% lock flush 3 milliLiter(s) IV Push once    MEDICATIONS  (PRN):  acetaminophen     Tablet .. 650 milliGRAM(s) Oral every 6 hours PRN Temp greater or equal to 38C (100.4F), Mild Pain (1 - 3)  acetaminophen   IVPB .. 1000 milliGRAM(s) IV Intermittent once PRN Mild Pain (1 - 3)  morphine  - Injectable 2 milliGRAM(s) IV Push every 4 hours PRN Severe Pain (7 - 10)  ondansetron Injectable 4 milliGRAM(s) IV Push every 6 hours PRN Nausea and/or Vomiting  oxycodone    5 mG/acetaminophen 325 mG 1 Tablet(s) Oral every 4 hours PRN Mild Pain (1 - 3)  oxycodone    5 mG/acetaminophen 325 mG 2 Tablet(s) Oral every 4 hours PRN Moderate Pain (4 - 6)      LABS:                        12.3   12.69 )-----------( 289      ( 01 Jun 2024 07:57 )             35.8     06-01    137  |  107  |  15  ----------------------------<  125<H>  4.4   |  26  |  1.14    Ca    8.9      01 Jun 2024 07:57  Phos  3.7     06-01  Mg     1.9     06-01    TPro  6.4  /  Alb  3.3  /  TBili  0.3  /  DBili  x   /  AST  21  /  ALT  25  /  AlkPhos  49  06-01    PT/INR - ( 31 May 2024 18:10 )   PT: 10.7 sec;   INR: 0.95 ratio         PTT - ( 31 May 2024 18:10 )  PTT:30.5 sec  Urinalysis Basic - ( 01 Jun 2024 07:57 )

## 2024-06-04 NOTE — CONSULT NOTE ADULT - SUBJECTIVE AND OBJECTIVE BOX
HPI:  43 yo M with no significant PMHx p/w L calf pain. He report of intermittent pain for the past 2 weeks. This was associated with swelling which usually resolved spontaneously. He however experienced severe calf pain with increased swelling compare to prior episode. He denies trauma, not on any AC, no prior hx of this symptoms       PAST MEDICAL & SURGICAL HISTORY:      MEDICATIONS  (STANDING):  sodium chloride 0.9% lock flush 3 milliLiter(s) IV Push once    MEDICATIONS  (PRN):      Allergies    No Known Allergies    Intolerances        SOCIAL HISTORY:    FAMILY HISTORY:          Physical Exam:  General: NAD, resting comfortably  HEENT: NC/AT, EOMI, normal hearing, no oral lesions, no LAD, neck supple  Pulmonary: normal resp effort, CTA-B  Cardiovascular: NSR, no murmurs  Abdominal: soft, ND/NT, no organomegaly  Extremities: Swollen L calf and ankle with tenderness on dorsiflexion of the foot limiting FROM, L calf measures 44cm circumference at a reference point 10cm from the tuberosity. Sensation is slight decrease compared to the RLE, no skin changes    Neuro: A/O x 3, CNs II-XII grossly intact, normal sensation, no focal deficits  Pulses: palpable distal pulses    Vital Signs Last 24 Hrs  T(C): 36.9 (31 May 2024 15:16), Max: 36.9 (31 May 2024 15:16)  T(F): 98.5 (31 May 2024 15:16), Max: 98.5 (31 May 2024 15:16)  HR: 82 (31 May 2024 15:16) (77 - 82)  BP: 113/72 (31 May 2024 15:16) (113/72 - 139/85)  BP(mean): 83 (31 May 2024 15:16) (83 - 91)  RR: 18 (31 May 2024 15:16) (18 - 18)  SpO2: 100% (31 May 2024 15:16) (100% - 100%)    Parameters below as of 31 May 2024 15:16  Patient On (Oxygen Delivery Method): room air    LABS:  Pending       RADIOLOGY & ADDITIONAL STUDIES:  < from: US Duplex Venous Lower Ext Ltd, Left (05.31.24 @ 14:07) >  IMPRESSION:  No evidence of left lower extremity deep venous thrombosis. There is a   9.5 x 6.8 x 3.7 cm heterogeneous in echotexture collection within the   left calf musculature, possible hematoma. Correlate clinically.        < end of copied text >              
Plastic Surgery Consult Note  (pg [facility]: [id number], [facility]: [phone])    HPI:  45 yo M with no significant PMHx p/w L calf pain. He report of intermittent pain for the past 2 weeks. This was associated with swelling which usually resolved spontaneously. He however experienced severe calf pain with increased swelling compare to prior episode. He denies trauma, not on any AC, no prior hx of this symptoms  (31 May 2024 17:43)    Underwent lateral and medial fasciotomy with vascular surgery 4 days ago and has been doing xeroform daily dressing changes. PRS consulted for fasciotomy closure.    PAST MEDICAL & SURGICAL HISTORY:  No significant past surgical history        Allergies    No Known Allergies    Intolerances      Home Medications: None    MEDICATIONS  (STANDING):  acetaminophen     Tablet .. 650 milliGRAM(s) Oral every 6 hours  heparin   Injectable 5000 Unit(s) SubCutaneous every 8 hours  polyethylene glycol 3350 17 Gram(s) Oral daily  sodium chloride 0.9% lock flush 3 milliLiter(s) IV Push once      SOCIAL HISTORY: non contributory  FAMILY HISTORY: non contributary  No pertinent family history in first degree relatives        ___________________________________________  OBJECTIVE:  Vital Signs Last 24 Hrs  T(C): 36.7 (04 Jun 2024 07:21), Max: 36.8 (04 Jun 2024 00:00)  T(F): 98.1 (04 Jun 2024 07:21), Max: 98.2 (04 Jun 2024 00:00)  HR: 74 (04 Jun 2024 07:21) (65 - 77)  BP: 122/77 (04 Jun 2024 07:21) (116/71 - 137/78)  BP(mean): 93 (03 Jun 2024 16:00) (93 - 93)  RR: 18 (04 Jun 2024 07:21) (18 - 18)  SpO2: 100% (04 Jun 2024 07:21) (98% - 100%)    Parameters below as of 04 Jun 2024 07:21  Patient On (Oxygen Delivery Method): room air    CAPILLARY BLOOD GLUCOSE        PE:    General: Well developed, well nourished, NAD  Neuro: Alert and oriented, no focal deficits, moves all extremities spontaneously  LLE: lateral and medial fasciotomy wounds with healthy muscle, clean. +PT and DP. Moves toes up and down. SILT  ____________________________________________  LABS:  CBC Full  -  ( 04 Jun 2024 08:36 )  WBC Count : 9.26 K/uL  RBC Count : 4.67 M/uL  Hemoglobin : 13.6 g/dL  Hematocrit : 39.9 %  Platelet Count - Automated : 333 K/uL  Mean Cell Volume : 85.4 fl  Mean Cell Hemoglobin : 29.1 pg  Mean Cell Hemoglobin Concentration : 34.1 gm/dL  Auto Neutrophil # : 6.75 K/uL  Auto Lymphocyte # : 1.49 K/uL  Auto Monocyte # : 0.81 K/uL  Auto Eosinophil # : 0.10 K/uL  Auto Basophil # : 0.07 K/uL  Auto Neutrophil % : 72.9 %  Auto Lymphocyte % : 16.1 %  Auto Monocyte % : 8.7 %  Auto Eosinophil % : 1.1 %  Auto Basophil % : 0.8 %    06-04    135  |  106  |  16  ----------------------------<  103<H>  4.6   |  25  |  1.25    Ca    9.6      04 Jun 2024 08:36  Phos  3.6     06-04  Mg     2.1     06-04    TPro  7.1  /  Alb  3.6  /  TBili  0.5  /  DBili  x   /  AST  24  /  ALT  36  /  AlkPhos  56  06-04    LIVER FUNCTIONS - ( 04 Jun 2024 08:36 )  Alb: 3.6 g/dL / Pro: 7.1 gm/dL / ALK PHOS: 56 U/L / ALT: 36 U/L / AST: 24 U/L / GGT: x           A/P: s/p LLE medial and lateral fasciotomy 4 days ago   - Discussed all options including going straight to STSG this week, WV to get smaller wounds then STSG vs partial primary closure, or Dermaclose. Discussed risks, benefits, alternatives of each with patient and his wife. Patient is going to decide which he would like. In the meantime, plan for bedside WV application. If we continue with this plan, patient could go home with WV and plan to do WV changes in my office twice a week until swelling improved and smaller wounds.

## 2024-06-04 NOTE — DISCHARGE NOTE PROVIDER - HOSPITAL COURSE
45 yo M with no significant PMHx p/w L calf pain. He report of intermittent pain for the past 2 weeks. This was associated with swelling which usually resolved spontaneously. He however experienced severe calf pain with increased swelling compare to prior episode. He denies trauma, not on any AC, no prior hx of this symptoms  (31 May 2024 17:43)    Pt admitted w c/f compartment syndrome, Underwent urgent lateral and medial fasciotomy with vascular surgery and has been doing xeroform daily dressing changes. PRS consulted for fasciotomy closure. they placed a wound vac w plan of outpt follow up for graft vs closure

## 2024-06-04 NOTE — DISCHARGE NOTE PROVIDER - DISCHARGE SERVICE FOR PATIENT
Specialty Pharmacy Refill Coordination Note     Ivette is a 39 y.o. female contacted today regarding refills of Aimovig specialty medication(s).. patient Injection is due on 12/26    Reviewed and verified with patient:       Specialty medication(s) and dose(s) confirmed: yes    Refill Questions      Flowsheet Row Most Recent Value   Changes to allergies? No   Changes to medications? No   New conditions or infections since last clinic visit No   Unplanned office visit, urgent care, ED, or hospital admission in the last 4 weeks  No   How does patient/caregiver feel medication is working? Very good   Financial problems or insurance changes  No   If yes, describe changes in insurance or financial issues. N/A   Since the previous refill, were any specialty medication doses or scheduled injections missed or delayed?  No   If yes, please provide the amount N/A   Why were doses missed? N/A   Does this patient require a clinical escalation to a pharmacist? No            Delivery Questions      Flowsheet Row Most Recent Value   Delivery method [employer]   Delivery address verified with patient/caregiver? Yes   Delivery address Home   Number of medications in delivery 1   Medication(s) being filled and delivered Erenumab-Aooe   Doses left of specialty medications N/A   Copay verified? Yes   Copay amount N/A   Copay form of payment No copayment ($0)              Medication Adherence          Adherence tools used: calendar      Support network for adherence: healthcare provider                Follow-up: 30 day(s)     Viraj Masterson  Specialty Pharmacy Technician         on the discharge service for the patient. I have reviewed and made amendments to the documentation where necessary.

## 2024-06-04 NOTE — DISCHARGE NOTE PROVIDER - PROVIDER TOKENS
PROVIDER:[TOKEN:[[id number]],FOLLOWUP:[2 weeks]],PROVIDER:[TOKEN:[[id number]],FOLLOWUP:[1-3 days]]

## 2024-06-04 NOTE — DISCHARGE NOTE NURSING/CASE MANAGEMENT/SOCIAL WORK - PATIENT PORTAL LINK FT
You can access the FollowMyHealth Patient Portal offered by [facility] by registering at the following website: [email or web address]. By joining [employer]’s FollowMyHealth portal, you will also be able to view your health information using other applications (apps) compatible with our system.

## 2024-06-04 NOTE — DISCHARGE NOTE PROVIDER - NSDCFUADDINST_GEN_ALL_CORE_FT
You can take Tylenol or Ibuprofen for pain.  you will be followed up in plastic surgery clinic this Friday 6/7/24 for wound vac change  Vascular surgery team will also see you in clinic in two weeks.  The wound vac needs to stay on the incision as instructed, please call office if any malfunctioning or wound vac issue.

## 2024-06-04 NOTE — DISCHARGE NOTE PROVIDER - NSDCMRMEDTOKEN_GEN_ALL_CORE_FT
oxycodone-acetaminophen 5 mg-325 mg oral tablet: 1 tab(s) orally every 4 hours As needed Mild Pain (1 - 3)  oxycodone-acetaminophen 5 mg-325 mg oral tablet: 2 tab(s) orally every 4 hours As needed Moderate Pain (4 - 6)

## 2024-06-04 NOTE — PROGRESS NOTE ADULT - ASSESSMENT
43yo M with compartment syndrome now POD#3 s/p LLE fasciotomy.    Doing well, pain controlled    Plan:    - Pain and nausea control PRN  - PT evaluation  - Regular diet  - daily dressing change  - DVT ppx  - Encourage IS use  - plastic surgery  consult for closure    Case discussed with Dr. Kelly    
43yo M with compartment syndrome now POD#4 s/p LLE fasciotomy.    Doing well, pain controlled  Plastic surgery consulted for closure    Plan:    - Pain and nausea control PRN  - PT evaluation- crutches training, outpatient PT  - Regular diet  - daily dressing change  - DVT ppx  - Encourage IS use  - plastic surgery  consult for closure    Case discussed with Dr. Kelly    
43yo M with compartment syndrome now POD#2 s/p LLE fasciotomy.    Plan:  - Pain and nausea control PRN  - PT evaluation  - Regular diet  - daily dressing change  - DVT ppx  - Encourage IS use  - plastic surgery  consult     
45yo M with compartment syndrome now POD#1 s/p LLE fasciotomy.    Plan:  - Pain and nausea control PRN  - PT evaluation  - Regular diet  - Keep surgical dressing  - DVT ppx  - Encourage IS use    Discussed with Dr. Kelly

## 2024-06-04 NOTE — DISCHARGE NOTE NURSING/CASE MANAGEMENT/SOCIAL WORK - NSDCPEFALRISK_GEN_ALL_CORE
For information on Fall & Injury Prevention, visit: [email or web address] OR  [email or web address] OR  https://www.cdc.gov/steadi/patient.html

## 2024-06-06 NOTE — CDI QUERY NOTE - NSCDIOTHERTXTBX_GEN_ALL_CORE_HH
The operative report documents: both right and left legs. Could you please clarify in the operative report the side which was operated on    -Right lower extremity 4 compartment fasciotomy and evacuation of hematoma  -Left lower extremity 4 compartment fasciotomy and evacuation of hematoma  -Other please specify    Chart documentation and clinical evidence    Brief op report -Post-Op DiagnosisPOST-OP DIAGNOSIS:Hematoma 31-May-2024 19:18:24 left lower extremity hematoma Judith Andersen  ProcedurePROCEDURES:Decompression fasciotomy, leg 31-May-2024 19:17:55  Judith Andersen      OPERATIVE REPORT  Pre operative Dx Right lower extremity compartment  syndrome  Post operative Dx -Right lower extremity compartment  syndrome  Operation  Left lower extremity 4 compartment fasciotomy and evacuation of hematoma    H&P-Extremities: Swollen L calf and ankle with tenderness on dorsiflexion of the foot limiting FROM, L calf measures 44cm circumference at a reference point 10cm from the tuberosity. Sensation is slight decrease compared to the     US- There is a 9.5 x 6.8 x 3.7 cm heterogeneous in echotexture collection within the left calf musculature, possible hematoma. Correlate clinically.    Surgery PN 43yo M with compartment syndrome now POD#3 s/p LLE fasciotomy.    Debridement-Patient is a 44 year old male with LLE compartment syndrome POD#4 from LLE lateral and medical fasciotomies with vascular surgery. Wounds are clean with exposed muscle in wound bases. Medial measures 15 x 10 x 1 cm and lateral measures 6 x 3 x 1 cm.   Procedure: initial application of negative pressure wound dressing to LLE > 50 sq cm. The operative report documents: both right and left legs. Could you please clarify in the operative report the pre and post operative diagnoses laterality     -Pre operative Dx Left lower extremity compartment  syndrome and Post operative Dx -Left lower extremity compartment  syndrome  -Pre operative Dx Right lower extremity compartment  syndrome and Post operative Dx -Right lower extremity compartment  syndrome  -Other please specify    Chart documentation and clinical evidence    Brief op report -Post-Op DiagnosisPOST-OP DIAGNOSIS:Hematoma 31-May-2024 19:18:24 left lower extremity hematoma Judith Andersen  ProcedurePROCEDURES:Decompression fasciotomy, leg 31-May-2024 19:17:55  Judith Andersen      OPERATIVE REPORT  Pre operative Dx Right lower extremity compartment  syndrome  Post operative Dx -Right lower extremity compartment  syndrome  Operation  Left lower extremity 4 compartment fasciotomy and evacuation of hematoma    H&P-Extremities: Swollen L calf and ankle with tenderness on dorsiflexion of the foot limiting FROM, L calf measures 44cm circumference at a reference point 10cm from the tuberosity. Sensation is slight decrease compared to the     US- There is a 9.5 x 6.8 x 3.7 cm heterogeneous in echotexture collection within the left calf musculature, possible hematoma. Correlate clinically.    Surgery PN 45yo M with compartment syndrome now POD#3 s/p LLE fasciotomy.    Debridement-Patient is a 44 year old male with LLE compartment syndrome POD#4 from LLE lateral and medical fasciotomies with vascular surgery. Wounds are clean with exposed muscle in wound bases. Medial measures 15 x 10 x 1 cm and lateral measures 6 x 3 x 1 cm.   Procedure: initial application of negative pressure wound dressing to LLE > 50 sq cm.

## 2024-06-12 NOTE — CHART NOTE - NSCHARTNOTEFT_GEN_A_CORE
The side operated on was the left side.     CATIE Kelly MD Note Text (......Xxx Chief Complaint.): This diagnosis correlates with the Render Risk Assessment In Note?: no Detail Level: Zone Other (Free Text): Discussed side effects while being on Doxycycline (GI upset, nausea and possible yeast infections). \\nDiscussed cosmetic treatments and costs, like lasers, price given to patient per three sessions was around $1,000

## 2024-06-13 NOTE — CDI QUERY NOTE - NSCDIOTHERTXTBX_GEN_ALL_CORE_HH
Could you please clarify if this procedure was a debridement or application of negative pressure wound dressing, or both    If it is a debridement please  clarify and include the following additional detail:* The instrument alone will not dictate the classification of excisional debridement.   Type of debridement (excisional vs. non-excisional debridement)         Site debrided - anatomical location & laterality  Instrument(s) used - e.g. scalpel, knife, versa jet  Depth of tissue debrided such as:    Skin,  Fascia  Subcutaneous  Muscle  Tendon  Bone     A statement referring to “down to the level of ___” does not describe the specificity for coding.    Chart documentation    PROCEDURE: · Procedure Name Debridement· TIME OUT  Patient's first and last name, , procedure, and correct site confirmed prior to the start of procedure.  · Practitioner performing the TIME OUTGoulart  · Procedure Date/[id number] 12:34  · Informed ConsentBenefits, risks, and possible complications of procedure explained to patient/caregiver who verbalized understanding and gave verbal consent.  · Procedure Performed ByAttending  · Additional Procedure Details Patient is a 44 year old male with LLE compartment syndrome POD#4 from LLE lateral and medical fasciotomies with vascular surgery. Wounds are clean with exposed muscle in wound bases. Medial measures 15 x 10 x 1 cm and lateral measures 6 x 3 x 1 cm.      Procedure: initial application of negative pressure wound dressing to LLE > 50 sq cm.    Details: patient was given morphine for pain control prior to starting. Dressing was removed and wounds were cleaned with sterile water. Wound vac sponges were cut to the size of the wound and applied to both wounds with a xeroform barrier directly on the wounds. A bridging sponge was then placed to connect them and the beth pad was attached. The wound vac was turned on and set to wound vac therapy continuous at -125 mmHg. There was no air leak. ACE wraps were then applied to the LLE. The procedure was well tolerated and without complication.

## 2024-06-14 ENCOUNTER — INPATIENT (INPATIENT)
Facility: HOSPITAL | Age: 44
LOS: 0 days | Discharge: HOME | DRG: 951 | End: 2024-06-14
Attending: STUDENT IN AN ORGANIZED HEALTH CARE EDUCATION/TRAINING PROGRAM | Admitting: STUDENT IN AN ORGANIZED HEALTH CARE EDUCATION/TRAINING PROGRAM
Payer: COMMERCIAL

## 2024-06-14 VITALS
OXYGEN SATURATION: 96 % | SYSTOLIC BLOOD PRESSURE: 118 MMHG | HEART RATE: 95 BPM | HEIGHT: 67 IN | TEMPERATURE: 97 F | DIASTOLIC BLOOD PRESSURE: 73 MMHG | WEIGHT: 164.91 LBS | RESPIRATION RATE: 16 BRPM

## 2024-06-14 VITALS
OXYGEN SATURATION: 99 % | HEART RATE: 70 BPM | DIASTOLIC BLOOD PRESSURE: 74 MMHG | RESPIRATION RATE: 14 BRPM | SYSTOLIC BLOOD PRESSURE: 131 MMHG

## 2024-06-14 DIAGNOSIS — Z01.818 ENCOUNTER FOR OTHER PREPROCEDURAL EXAMINATION: ICD-10-CM

## 2024-06-14 LAB
ALBUMIN SERPL ELPH-MCNC: 3.7 G/DL — SIGNIFICANT CHANGE UP (ref 3.3–5)
ALP SERPL-CCNC: 64 U/L — SIGNIFICANT CHANGE UP (ref 40–120)
ALT FLD-CCNC: 29 U/L — SIGNIFICANT CHANGE UP (ref 10–45)
ANION GAP SERPL CALC-SCNC: 6 MMOL/L — SIGNIFICANT CHANGE UP (ref 5–17)
APTT BLD: 31.1 SEC — SIGNIFICANT CHANGE UP (ref 24.5–35.6)
AST SERPL-CCNC: 15 U/L — SIGNIFICANT CHANGE UP (ref 10–40)
BASOPHILS # BLD AUTO: 0.09 K/UL — SIGNIFICANT CHANGE UP (ref 0–0.2)
BASOPHILS NFR BLD AUTO: 1.3 % — SIGNIFICANT CHANGE UP (ref 0–2)
BILIRUB SERPL-MCNC: 0.4 MG/DL — SIGNIFICANT CHANGE UP (ref 0.2–1.2)
BLD GP AB SCN SERPL QL: SIGNIFICANT CHANGE UP
BUN SERPL-MCNC: 20 MG/DL — SIGNIFICANT CHANGE UP (ref 7–23)
CALCIUM SERPL-MCNC: 9.2 MG/DL — SIGNIFICANT CHANGE UP (ref 8.4–10.5)
CHLORIDE SERPL-SCNC: 104 MMOL/L — SIGNIFICANT CHANGE UP (ref 96–108)
CO2 SERPL-SCNC: 31 MMOL/L — SIGNIFICANT CHANGE UP (ref 22–31)
CREAT SERPL-MCNC: 1.26 MG/DL — SIGNIFICANT CHANGE UP (ref 0.5–1.3)
EGFR: 71 ML/MIN/1.73M2 — SIGNIFICANT CHANGE UP
EOSINOPHIL # BLD AUTO: 0.38 K/UL — SIGNIFICANT CHANGE UP (ref 0–0.5)
EOSINOPHIL NFR BLD AUTO: 5.5 % — SIGNIFICANT CHANGE UP (ref 0–6)
GLUCOSE SERPL-MCNC: 90 MG/DL — SIGNIFICANT CHANGE UP (ref 70–99)
HCT VFR BLD CALC: 38.1 % — LOW (ref 39–50)
HGB BLD-MCNC: 12.7 G/DL — LOW (ref 13–17)
IMM GRANULOCYTES NFR BLD AUTO: 0.4 % — SIGNIFICANT CHANGE UP (ref 0–0.9)
INR BLD: 0.93 RATIO — SIGNIFICANT CHANGE UP (ref 0.85–1.18)
LYMPHOCYTES # BLD AUTO: 1.71 K/UL — SIGNIFICANT CHANGE UP (ref 1–3.3)
LYMPHOCYTES # BLD AUTO: 24.9 % — SIGNIFICANT CHANGE UP (ref 13–44)
MCHC RBC-ENTMCNC: 28.9 PG — SIGNIFICANT CHANGE UP (ref 27–34)
MCHC RBC-ENTMCNC: 33.3 GM/DL — SIGNIFICANT CHANGE UP (ref 32–36)
MCV RBC AUTO: 86.6 FL — SIGNIFICANT CHANGE UP (ref 80–100)
MONOCYTES # BLD AUTO: 0.53 K/UL — SIGNIFICANT CHANGE UP (ref 0–0.9)
MONOCYTES NFR BLD AUTO: 7.7 % — SIGNIFICANT CHANGE UP (ref 2–14)
NEUTROPHILS # BLD AUTO: 4.13 K/UL — SIGNIFICANT CHANGE UP (ref 1.8–7.4)
NEUTROPHILS NFR BLD AUTO: 60.2 % — SIGNIFICANT CHANGE UP (ref 43–77)
NRBC # BLD: 0 /100 WBCS — SIGNIFICANT CHANGE UP (ref 0–0)
PLATELET # BLD AUTO: 383 K/UL — SIGNIFICANT CHANGE UP (ref 150–400)
POTASSIUM SERPL-MCNC: 4.1 MMOL/L — SIGNIFICANT CHANGE UP (ref 3.5–5.3)
POTASSIUM SERPL-SCNC: 4.1 MMOL/L — SIGNIFICANT CHANGE UP (ref 3.5–5.3)
PROT SERPL-MCNC: 7.1 G/DL — SIGNIFICANT CHANGE UP (ref 6–8.3)
PROTHROM AB SERPL-ACNC: 10.9 SEC — SIGNIFICANT CHANGE UP (ref 9.5–13)
RBC # BLD: 4.4 M/UL — SIGNIFICANT CHANGE UP (ref 4.2–5.8)
RBC # FLD: 12.6 % — SIGNIFICANT CHANGE UP (ref 10.3–14.5)
SODIUM SERPL-SCNC: 141 MMOL/L — SIGNIFICANT CHANGE UP (ref 135–145)
WBC # BLD: 6.87 K/UL — SIGNIFICANT CHANGE UP (ref 3.8–10.5)
WBC # FLD AUTO: 6.87 K/UL — SIGNIFICANT CHANGE UP (ref 3.8–10.5)

## 2024-06-14 RX ORDER — ONDANSETRON 8 MG/1
4 TABLET, FILM COATED ORAL ONCE
Refills: 0 | Status: DISCONTINUED | OUTPATIENT
Start: 2024-06-14 | End: 2024-06-14

## 2024-06-14 RX ORDER — HYDROMORPHONE HYDROCHLORIDE 2 MG/ML
0.5 INJECTION INTRAMUSCULAR; INTRAVENOUS; SUBCUTANEOUS
Refills: 0 | Status: DISCONTINUED | OUTPATIENT
Start: 2024-06-14 | End: 2024-06-14

## 2024-06-14 RX ORDER — HYDROMORPHONE HYDROCHLORIDE 2 MG/ML
1 INJECTION INTRAMUSCULAR; INTRAVENOUS; SUBCUTANEOUS
Refills: 0 | Status: DISCONTINUED | OUTPATIENT
Start: 2024-06-14 | End: 2024-06-14

## 2024-06-14 RX ORDER — MORPHINE SULFATE 50 MG/1
2 CAPSULE, EXTENDED RELEASE ORAL EVERY 4 HOURS
Refills: 0 | Status: DISCONTINUED | OUTPATIENT
Start: 2024-06-14 | End: 2024-06-14

## 2024-06-14 RX ORDER — SODIUM CHLORIDE 9 MG/ML
1000 INJECTION INTRAMUSCULAR; INTRAVENOUS; SUBCUTANEOUS
Refills: 0 | Status: DISCONTINUED | OUTPATIENT
Start: 2024-06-14 | End: 2024-06-14

## 2024-06-14 RX ORDER — SODIUM CHLORIDE 9 MG/ML
1000 INJECTION, SOLUTION INTRAVENOUS
Refills: 0 | Status: DISCONTINUED | OUTPATIENT
Start: 2024-06-14 | End: 2024-06-14

## 2024-06-14 RX ADMIN — HYDROMORPHONE HYDROCHLORIDE 0.5 MILLIGRAM(S): 2 INJECTION INTRAMUSCULAR; INTRAVENOUS; SUBCUTANEOUS at 16:43

## 2024-06-14 RX ADMIN — SODIUM CHLORIDE 75 MILLILITER(S): 9 INJECTION INTRAMUSCULAR; INTRAVENOUS; SUBCUTANEOUS at 12:41

## 2024-06-14 RX ADMIN — HYDROMORPHONE HYDROCHLORIDE 0.5 MILLIGRAM(S): 2 INJECTION INTRAMUSCULAR; INTRAVENOUS; SUBCUTANEOUS at 16:28

## 2024-06-14 NOTE — PRE-ANESTHESIA EVALUATION ADULT - NSANTHTOTALSCORECAL_ENT_A_CORE
Patient called in requesting return call back to discuss rash on arm where patient had procedure   Please return call    1

## 2024-06-14 NOTE — ED ADULT NURSE NOTE - NSFALLUNIVINTERV_ED_ALL_ED
Bed/Stretcher in lowest position, wheels locked, appropriate side rails in place/Call bell, personal items and telephone in reach/Instruct patient to call for assistance before getting out of bed/chair/stretcher/Non-slip footwear applied when patient is off stretcher/[place] to call system/Physically safe environment - no spills, clutter or unnecessary equipment/Purposeful proactive rounding/Room/bathroom lighting operational, light cord in reach

## 2024-06-14 NOTE — ED PROVIDER NOTE - OBJECTIVE STATEMENT
44-year-old male history of fasciotomy, today for preop.  Dr. richard betts to get the patient prepared for preop. PAtient denies any fever, NVD. Deniesa ny other symptoms.

## 2024-06-14 NOTE — ED PROVIDER NOTE - CLINICAL SUMMARY MEDICAL DECISION MAKING FREE TEXT BOX
44-year-old male here for preop for fasciotomy per , labs reviewed- acceptable, preop CXR/EKG done, admitted to medicine for further management

## 2024-06-14 NOTE — H&P ADULT - HISTORY OF PRESENT ILLNESS
43 y/o M, denies PMH, referred to ED by plastics Dr. Hanks for fasciotomy closure. Patient found with compartment syndrome requiring fasciotomy 5/31/24 at [facility]  45 y/o M, denies PMH, referred to ED by plastics Dr. Hanks for fasciotomy closure. Patient found with compartment syndrome requiring fasciotomy 5/31/24 at [facility], endorses no complications during surgery or post op. Has been managing wound vac at home, no issues. Takes ibuprofen 600mg BID prn, pain well controlled. Last PO intake 9pm 6/13. Denies headache, fever, chills, cp, sob, n/v, abd pain.

## 2024-06-14 NOTE — H&P ADULT - NSHPREVIEWOFSYSTEMS_GEN_ALL_CORE
REVIEW OF SYSTEMS:  CONSTITUTIONAL: No fever, weight loss, or fatigue  EYES: No eye pain, visual disturbances, or discharge  ENMT:  No difficulty hearing, tinnitus, vertigo; No sinus or throat pain  NECK: No pain or stiffness  RESPIRATORY: No cough, wheezing, chills or hemoptysis; No shortness of breath  CARDIOVASCULAR: No chest pain, palpitations, dizziness, or leg swelling  GASTROINTESTINAL: No abdominal or epigastric pain. No nausea, vomiting, or hematemesis; No diarrhea or constipation. No melena or hematochezia.  GENITOURINARY: No dysuria, frequency, hematuria, or incontinence  NEUROLOGICAL: No headaches, memory loss, loss of strength, numbness, or tremors  SKIN: No itching, burning, rashes, or lesions   LYMPH NODES: No enlarged glands  ENDOCRINE: No heat or cold intolerance; No hair loss  MUSCULOSKELETAL: No joint pain or swelling; No muscle, back, or extremity pain  PSYCHIATRIC: No depression, anxiety, mood swings, or difficulty sleeping  HEME/LYMPH: No easy bruising, or bleeding gums  ALLERGY AND IMMUNOLOGIC: No hives or eczema    ALL ROS REVIEWED AND NORMAL EXCEPT AS STATED ABOVE REVIEW OF SYSTEMS:  CONSTITUTIONAL: No fever, weight loss, or fatigue  EYES: No eye pain, visual disturbances, or discharge  ENMT:  No difficulty hearing, tinnitus, vertigo; No sinus or throat pain  NECK: No pain or stiffness  RESPIRATORY: No cough, wheezing, chills or hemoptysis; No shortness of breath  CARDIOVASCULAR: No chest pain, palpitations, dizziness, or leg swelling  GASTROINTESTINAL: No abdominal or epigastric pain. No nausea, vomiting, or hematemesis; No diarrhea or constipation. No melena or hematochezia.  GENITOURINARY: No dysuria, frequency, hematuria, or incontinence  NEUROLOGICAL: No headaches, memory loss, loss of strength, numbness, or tremors  SKIN: No itching, burning, rashes, or lesions   LYMPH NODES: No enlarged glands  ENDOCRINE: No heat or cold intolerance; No hair loss  MUSCULOSKELETAL: No joint pain; No muscle, back, or extremity pain  PSYCHIATRIC: No depression, anxiety, mood swings, or difficulty sleeping  HEME/LYMPH: No easy bruising, or bleeding gums  ALLERGY AND IMMUNOLOGIC: No hives or eczema    ALL ROS REVIEWED AND NORMAL EXCEPT AS STATED ABOVE

## 2024-06-14 NOTE — DISCHARGE NOTE PROVIDER - CARE PROVIDER_API CALL
Alley Hanks  Plastic Surgery  [address]  [place] [address]  Phone: [phone]  Fax: [phone]  Follow Up Time:

## 2024-06-14 NOTE — H&P ADULT - ASSESSMENT
43 y/o M, denies PMH, referred to ED by plastics Dr. Hanks for fasciotomy closure.     Hx of compartment syndrome  Fasciotomy 5/31/24  -Admit to surgery for fasciotomy closure  -Plastics - Dr. Hanks aware per ED  -Tele monitoring   -NPO, IVF  -EKG reviewed - Sinus talon, HR 57, qtc 418, no acute ST-T wave abnormalities  -Pain management  -Patient medically optimized for OR     Vitals q8h  Diet: NPO  Activity: Bedrest  DVT ppx: Hold for OR  Standard precautions: Aspiration, fall, safety, seizure, skin  Code Status - Full Code  HCP - Wife Maria G  updated at bedside 43 y/o M, denies PMH, referred to ED by plastics Dr. Hanks for fasciotomy closure.     Hx of compartment syndrome  Fasciotomy 5/31/24  -Admit to surgery for fasciotomy closure  -Plastics - Dr. Hanks aware per ED  -Tele monitoring   -NPO, IVF  -EKG reviewed - Sinus talon, HR 57, qtc 418, no acute ST-T wave abnormalities  -Pain management  -Labs, imaging reviewed - patient medically optimized for OR   -Maintain active T&S     Vitals q8h  Diet: NPO  Activity: Bedrest  DVT ppx: Hold for OR  Standard precautions: Aspiration, fall, safety, seizure, skin  Code Status - Full Code  HCP - Wife Maria G  updated at bedside

## 2024-06-14 NOTE — ED ADULT TRIAGE NOTE - CHIEF COMPLAINT QUOTE
Patient presents to ED stating that plastic surgeon gabby told patient to come to [facility] ED to have leg closed up. Patient had a fasciotomy 2 weeks ago at [facility] on left leg for compartment syndrome. Patient currently has medvac attached. Patient denies fevers, denies pain, denies large blood loss.

## 2024-06-14 NOTE — H&P ADULT - NSHPLABSRESULTS_GEN_ALL_CORE
LABS:                        12.7   6.87  )-----------( 383      ( 14 Jun 2024 08:55 )             38.1     06-14    141  |  104  |  20  ----------------------------<  90  4.1   |  31  |  1.26    Ca    9.2      14 Jun 2024 08:55    TPro  7.1  /  Alb  3.7  /  TBili  0.4  /  DBili  x   /  AST  15  /  ALT  29  /  AlkPhos  64  06-14    PT/INR - ( 14 Jun 2024 08:55 )   PT: 10.9 sec;   INR: 0.93 ratio         PTT - ( 14 Jun 2024 08:55 )  PTT:31.1 sec  Urinalysis Basic - ( 14 Jun 2024 08:55 )    Color: x / Appearance: x / SG: x / pH: x  Gluc: 90 mg/dL / Ketone: x  / Bili: x / Urobili: x   Blood: x / Protein: x / Nitrite: x   Leuk Esterase: x / RBC: x / WBC x   Sq Epi: x / Non Sq Epi: x / Bacteria: x       CAPILLARY BLOOD GLUCOSE            Urinalysis Basic - ( 14 Jun 2024 08:55 )    Color: x / Appearance: x / SG: x / pH: x  Gluc: 90 mg/dL / Ketone: x  / Bili: x / Urobili: x   Blood: x / Protein: x / Nitrite: x   Leuk Esterase: x / RBC: x / WBC x   Sq Epi: x / Non Sq Epi: x / Bacteria: x        RADIOLOGY & ADDITIONAL TESTS:        Consultant(s) Notes Reviewed:  [x] YES  [ ] NO  Care Discussed with Consultants/Other Providers [x] YES  [ ] NO  Imaging Personally Reviewed:  [x] YES  [ ] NO

## 2024-06-14 NOTE — DISCHARGE NOTE PROVIDER - HOSPITAL COURSE
Sexual Health Inventory for Men (ROLLY)          done on 3/12/2021  How do you rate your confidence that you could get and keep an erection?     3   moderate   When you had erections with sexual stimulation, how often were your erections hard enough for penetration (entering your partner)?  2   a few times (much less than 1/2)   During sexual intercourse, how often were you able to maintain your erection after you had penetrated (entered) your partner?   2   a few times (much less than 1/2)   During sexual intercourse, how difficult was it to maintain your erection to completion of intercourse?  3   difficult   When you attempted sexual intercourse, how often was it satisfactory for you?  3   sometimes (about 1/2)    TOTAL SCORE:  13     1 -7 Severe ED         8 - 11 Moderate ED         12-16  Mild to Moderate ED         17 - 21 Mild ED                43 yo male underwent an uneventful lower extremity fasciotomy closure and wound vac placement. Pt is cleared by plastic surgery for discharge home

## 2024-06-14 NOTE — DISCHARGE NOTE NURSING/CASE MANAGEMENT/SOCIAL WORK - PATIENT PORTAL LINK FT
You can access the FollowMyHealth Patient Portal offered by [facility] by registering at the following website: [email or web address]. By joining [facility]’s FollowMyHealth portal, you will also be able to view your health information using other applications (apps) compatible with our system.

## 2024-06-14 NOTE — ED ADULT NURSE NOTE - CHIEF COMPLAINT QUOTE
Patient presents to ED stating that plastic surgeon gabby told patient to come to [facility] ED to have leg closed up. Patient had a fasciotomy 2 weeks ago at [facility] on left leg for compartment syndrome. Patient currently has medvac attached. Patient denies fevers, denies pain, denies large blood loss.

## 2024-06-14 NOTE — ED ADULT NURSE NOTE - OBJECTIVE STATEMENT
Pt, to ED stating he was sent in by Dr. Lopez to go to the OR today to have a wound sewn shut.  Pt. states two weeks ago he had a fasciotomy at [facility] and has had a wound vac to left leg.  Pt. states he has been NPO since 930pm last night.

## 2024-06-14 NOTE — PRE-OP CHECKLIST - NS PREOP CHK MONITOR ANESTHESIA CONSENT
Palliative care encounter  Transition to comfort measures per family  D/c antibiotics  Morphine for dyspnea  Ativan for anxiety  Scopolamine  Bisacodyl     done

## 2024-06-14 NOTE — CONSULT NOTE ADULT - SUBJECTIVE AND OBJECTIVE BOX
Plastic/Hand Surgery Consult    SUBJECTIVE:  HPI:  43 y/o M, denies PMH, referred to ED for LLE pain post fasciotomy. Patient found with compartment syndrome requiring fasciotomy 5/31/24 at [facility], endorses no complications during surgery or post op. Has been managing wound vac at home, no issues. Takes ibuprofen 600mg BID prn, pain well controlled. Last PO intake 9pm 6/13. Denies headache, fever, chills, cp, sob, n/v, abd pain.  (14 Jun 2024 10:31)      OBJECTIVE:     -- CONSTITUTIONAL: AOx3. NAD.   -- HEENT: atraumatic, no gross abnormalities  -- CARDIOVASCULAR: RRR  -- RESPIRATORY: Equal chest rise  -- ABDOMEN: Soft. Nontender. Nondistended.  -- NEUROLOGICAL: Awake and alert  -- EXT: Motor and sensory are grossly intact in bilateral upper and lower extremities. WV in place to LLE with good seal, no air leak    ** VITAL SIGNS / I&O's **    T(C): 36.1 (06-14-24 @ 08:41), Max: 36.1 (06-14-24 @ 08:41)  T(F): 97 (06-14-24 @ 08:41), Max: 97 (06-14-24 @ 08:41)  HR: 95 (06-14-24 @ 08:41) (95 - 95)  BP: 118/73 (06-14-24 @ 08:41) (118/73 - 118/73)  RR: 16 (06-14-24 @ 08:41) (16 - 16)  SpO2: 96% (06-14-24 @ 08:41) (96% - 96%)          ** LABS **                 12.7   6.87   )----------(  383       ( 14 Jun 2024 08:55 )               38.1      141    |  104    |  20     ----------------------------<  90         ( 14 Jun 2024 08:55 )  4.1     |  31     |  1.26     Ca    9.2        ( 14 Jun 2024 08:55 )    TPro  7.1    /  Alb  3.7    /  TBili  0.4    /  DBili  x      /  AST  15     /  ALT  29     /  AlkPhos  64     ( 14 Jun 2024 08:55 )    PT/INR -  10.9 sec / 0.93 ratio   ( 14 Jun 2024 08:55 )       PTT -  31.1 sec   ( 14 Jun 2024 08:55 )  CAPILLARY BLOOD GLUCOSE      A/P: s/p LLE fasciotomy with WV in place medial and lateral with increased pain/discomfort   - NPO  - preop labs  - OR today for LLE wound debridement, ATT vs complex closure, possible STSG or FTSG, application of WV  - Plan for discharge home post op  - FU in office in 1 week

## 2024-06-14 NOTE — PRE-ANESTHESIA EVALUATION ADULT - NSANTHADDINFOFT_GEN_ALL_CORE
Discussed GA with LMA/ETT in detail with patient and all questions sought and answered. Pt. agrees to all plans and wishes to proceed with surgical care.    Medical evaluation and optimization noted and appreciated.

## 2024-06-14 NOTE — H&P ADULT - CONVERSATION DETAILS
Goals of care reviewed with patient and wife at bedside. He confirms his HCP is his wife Maria G . Advanced directives reviewed - he remains Full Code at this time. All questions/concerns addressed.

## 2024-06-14 NOTE — H&P ADULT - NSHPPHYSICALEXAM_GEN_ALL_CORE
T(C): 36.1 (06-14-24 @ 08:41), Max: 36.1 (06-14-24 @ 08:41)  HR: 95 (06-14-24 @ 08:41) (95 - 95)  BP: 118/73 (06-14-24 @ 08:41) (118/73 - 118/73)  RR: 16 (06-14-24 @ 08:41) (16 - 16)  SpO2: 96% (06-14-24 @ 08:41) (96% - 96%)  Wt(kg): --Vital Signs Last 24 Hrs  T(C): 36.1 (14 Jun 2024 08:41), Max: 36.1 (14 Jun 2024 08:41)  T(F): 97 (14 Jun 2024 08:41), Max: 97 (14 Jun 2024 08:41)  HR: 95 (14 Jun 2024 08:41) (95 - 95)  BP: 118/73 (14 Jun 2024 08:41) (118/73 - 118/73)  BP(mean): --  RR: 16 (14 Jun 2024 08:41) (16 - 16)  SpO2: 96% (14 Jun 2024 08:41) (96% - 96%)    Parameters below as of 14 Jun 2024 08:41  Patient On (Oxygen Delivery Method): room air        PHYSICAL EXAM:  GENERAL: NAD, well-groomed, well-developed  HEAD:  Atraumatic, Normocephalic  EYES: EOMI, PERRLA, conjunctiva and sclera clear  ENMT: No tonsillar erythema, exudates, or enlargement; Moist mucous membranes, Good dentition, No lesions  NECK: Supple, No JVD, Normal thyroid  NERVOUS SYSTEM:  Alert & Oriented X3, Good concentration; Motor Strength 5/5 B/L upper and lower extremities; DTRs 2+ intact and symmetric  CHEST/LUNG: Clear to percussion bilaterally; No rales, rhonchi, wheezing, or rubs  HEART: Regular rate and rhythm; No murmurs, rubs, or gallops  ABDOMEN: Soft, Nontender, Nondistended; Bowel sounds present  EXTREMITIES:  2+ Peripheral Pulses, No clubbing, cyanosis. +LLE wound vac, +trace LLE edema  LYMPH: No lymphadenopathy noted  SKIN: No rashes or lesions

## (undated) DEVICE — SOL IRR POUR H2O 1500ML

## (undated) DEVICE — PACK MINOR

## (undated) DEVICE — COTTONBALL LG

## (undated) DEVICE — GLV 8 PROTEXIS (WHITE)

## (undated) DEVICE — WARMING BLANKET UPPER ADULT

## (undated) DEVICE — STAPLER SKIN VISI-STAT 35 WIDE

## (undated) DEVICE — GLV 7.5 PROTEXIS (WHITE)

## (undated) DEVICE — DRSG CURITY GAUZE SPONGE 4 X 4" 12-PLY

## (undated) DEVICE — LAP PAD 18 X 18"

## (undated) DEVICE — SUT QUILL MONODERM 3-0 30CM PS-2

## (undated) DEVICE — DRAPE LIGHT HANDLE COVER (GREEN)

## (undated) DEVICE — Device

## (undated) DEVICE — PREP CHLORAPREP HI-LITE ORANGE 26ML

## (undated) DEVICE — GLV 6.5 PROTEXIS (WHITE)

## (undated) DEVICE — SUT MONOCRYL 3-0 27" PS-2 UNDYED

## (undated) DEVICE — DRAPE 1/2 SHEET 40X57"

## (undated) DEVICE — DRAPE 3/4 SHEET 52X76"

## (undated) DEVICE — SOL IRR POUR NS 0.9% 500ML

## (undated) DEVICE — VENODYNE/SCD SLEEVE CALF MEDIUM

## (undated) DEVICE — DRSG VAC GRANUFOAM MEDIUM (BLACK)

## (undated) DEVICE — SUT POLYSORB 4-0 30" C-13 UNDYED

## (undated) DEVICE — DRAPE TOWEL BLUE 17" X 24"

## (undated) DEVICE — PREP BETADINE KIT

## (undated) DEVICE — POSITIONER STRAP ARMBOARD VELCRO TS-30

## (undated) DEVICE — DRSG STERISTRIPS 0.5 X 4"

## (undated) DEVICE — POSITIONER FOAM HEAD CRADLE (PINK)